# Patient Record
Sex: MALE | Race: BLACK OR AFRICAN AMERICAN | Employment: UNEMPLOYED | URBAN - METROPOLITAN AREA
[De-identification: names, ages, dates, MRNs, and addresses within clinical notes are randomized per-mention and may not be internally consistent; named-entity substitution may affect disease eponyms.]

---

## 2022-04-16 ENCOUNTER — APPOINTMENT (OUTPATIENT)
Dept: GENERAL RADIOLOGY | Age: 57
End: 2022-04-16
Attending: STUDENT IN AN ORGANIZED HEALTH CARE EDUCATION/TRAINING PROGRAM
Payer: COMMERCIAL

## 2022-04-16 ENCOUNTER — HOSPITAL ENCOUNTER (EMERGENCY)
Age: 57
Discharge: HOME | End: 2022-04-16
Attending: STUDENT IN AN ORGANIZED HEALTH CARE EDUCATION/TRAINING PROGRAM
Payer: COMMERCIAL

## 2022-04-16 ENCOUNTER — APPOINTMENT (OUTPATIENT)
Dept: GENERAL RADIOLOGY | Age: 57
End: 2022-04-16
Attending: PHYSICIAN ASSISTANT
Payer: COMMERCIAL

## 2022-04-16 VITALS
SYSTOLIC BLOOD PRESSURE: 156 MMHG | DIASTOLIC BLOOD PRESSURE: 125 MMHG | HEART RATE: 98 BPM | RESPIRATION RATE: 15 BRPM | TEMPERATURE: 98.5 F | OXYGEN SATURATION: 98 %

## 2022-04-16 DIAGNOSIS — M24.412 CHRONIC DISLOCATION OF LEFT SHOULDER: Primary | ICD-10-CM

## 2022-04-16 PROCEDURE — 73020 X-RAY EXAM OF SHOULDER: CPT

## 2022-04-16 PROCEDURE — 75810000301 HC ER LEVEL 1 CLOSED TREATMNT FRACTURE/DISLOCATION

## 2022-04-16 PROCEDURE — 99284 EMERGENCY DEPT VISIT MOD MDM: CPT

## 2022-04-16 PROCEDURE — 74011250637 HC RX REV CODE- 250/637: Performed by: PHYSICIAN ASSISTANT

## 2022-04-16 PROCEDURE — 74011250636 HC RX REV CODE- 250/636: Performed by: PHYSICIAN ASSISTANT

## 2022-04-16 PROCEDURE — 73030 X-RAY EXAM OF SHOULDER: CPT

## 2022-04-16 PROCEDURE — 96372 THER/PROPH/DIAG INJ SC/IM: CPT

## 2022-04-16 RX ORDER — DEXAMETHASONE SODIUM PHOSPHATE 100 MG/10ML
10 INJECTION INTRAMUSCULAR; INTRAVENOUS
Status: COMPLETED | OUTPATIENT
Start: 2022-04-16 | End: 2022-04-16

## 2022-04-16 RX ORDER — DIAZEPAM 2 MG/1
2 TABLET ORAL
Status: COMPLETED | OUTPATIENT
Start: 2022-04-16 | End: 2022-04-16

## 2022-04-16 RX ORDER — KETOROLAC TROMETHAMINE 30 MG/ML
60 INJECTION, SOLUTION INTRAMUSCULAR; INTRAVENOUS
Status: COMPLETED | OUTPATIENT
Start: 2022-04-16 | End: 2022-04-16

## 2022-04-16 RX ORDER — OXYCODONE AND ACETAMINOPHEN 7.5; 325 MG/1; MG/1
1 TABLET ORAL
Status: COMPLETED | OUTPATIENT
Start: 2022-04-16 | End: 2022-04-16

## 2022-04-16 RX ORDER — ONDANSETRON 2 MG/ML
4 INJECTION INTRAMUSCULAR; INTRAVENOUS
Status: DISCONTINUED | OUTPATIENT
Start: 2022-04-16 | End: 2022-04-17 | Stop reason: HOSPADM

## 2022-04-16 RX ORDER — MORPHINE SULFATE 4 MG/ML
4 INJECTION INTRAVENOUS
Status: DISCONTINUED | OUTPATIENT
Start: 2022-04-16 | End: 2022-04-17 | Stop reason: HOSPADM

## 2022-04-16 RX ORDER — DOCUSATE SODIUM 100 MG/1
CAPSULE, LIQUID FILLED ORAL
COMMUNITY

## 2022-04-16 RX ORDER — OXYCODONE AND ACETAMINOPHEN 5; 325 MG/1; MG/1
1 TABLET ORAL
Qty: 12 TABLET | Refills: 0 | Status: SHIPPED | OUTPATIENT
Start: 2022-04-16 | End: 2022-04-19

## 2022-04-16 RX ADMIN — DEXAMETHASONE SODIUM PHOSPHATE 10 MG: 10 INJECTION, SOLUTION INTRAMUSCULAR; INTRAVENOUS at 22:13

## 2022-04-16 RX ADMIN — OXYCODONE AND ACETAMINOPHEN 1 TABLET: 7.5; 325 TABLET ORAL at 22:13

## 2022-04-16 RX ADMIN — KETOROLAC TROMETHAMINE 60 MG: 30 INJECTION, SOLUTION INTRAMUSCULAR at 22:13

## 2022-04-16 RX ADMIN — DIAZEPAM 2 MG: 2 TABLET ORAL at 22:13

## 2022-04-16 NOTE — ED TRIAGE NOTES
Pt to er c/o left arm/shoulder pain for over a month, has been seen by his doctor for this and sts also been seen by an orthopedic doctor for his arm pain, sts it continues to dislocate.   Pulses present

## 2022-04-16 NOTE — ED NOTES
Pt sts he fell off the toilet march 24th, did not hit head, sts his arm was hurting prior to this fall but did hurt worse after fall

## 2022-04-17 NOTE — ED PROVIDER NOTES
Patient had a stroke 1-1/2 years ago and has had residual left-sided paralysis. He has had chronic left shoulder dislocation. He fell off the toilet as he slipped on some water about a month ago and thinks he may have exacerbated it. He has not been seen since then. There is no tingling to the arm or decreased sensation. He is right-handed. A friend drove him in tonight. Apparently he is staying in a hotel and has transportation problems. He had seen an orthopedic in the past regarding this but was unable to get to the appointments. No chest pain, shortness of breath, abdominal pain, dizziness or weakness, trouble with urination or bowel movements, swelling to the arm or other new symptoms. His friend was concerned because he is having trouble sleeping due to the pain in the shoulder so she brought him in. The history is provided by the patient. Shoulder Pain  Pertinent negatives include no chest pain, no abdominal pain, no headaches and no shortness of breath. Arm Pain  This is a chronic problem. The current episode started more than 1 week ago. The problem occurs constantly. The problem has been gradually worsening. Pertinent negatives include no chest pain, no abdominal pain, no headaches and no shortness of breath. Nothing aggravates the symptoms. Nothing relieves the symptoms. He has tried nothing for the symptoms. Past Medical History:   Diagnosis Date    Stroke [facility])        History reviewed. No pertinent surgical history. History reviewed. No pertinent family history.     Social History     Socioeconomic History    Marital status: SINGLE     Spouse name: Not on file    Number of children: Not on file    Years of education: Not on file    Highest education level: Not on file   Occupational History    Not on file   Tobacco Use    Smoking status: Never Smoker    Smokeless tobacco: Never Used   Substance and Sexual Activity    Alcohol use: Not Currently    Drug use: Never    Sexual activity: Not on file   Other Topics Concern    Not on file   Social History Narrative    Not on file     Social Determinants of Health     Financial Resource Strain:     Difficulty of Paying Living Expenses: Not on file   Food Insecurity:     Worried About Running Out of Food in the Last Year: Not on file    [place] of Food in the Last Year: Not on file   Transportation Needs:     Lack of Transportation (Medical): Not on file    Lack of Transportation (Non-Medical): Not on file   Physical Activity:     Days of Exercise per Week: Not on file    Minutes of Exercise per Session: Not on file   Stress:     Feeling of Stress : Not on file   Social Connections:     Frequency of Communication with Friends and Family: Not on file    Frequency of Social Gatherings with Friends and Family: Not on file    Attends [employer] Services: Not on file    Active Member of [address] [place] [address] Social Pulse or Organizations: Not on file    Attends Club or Organization Meetings: Not on file    Marital Status: Not on file   Intimate Partner Violence:     Fear of Current or Ex-Partner: Not on file    Emotionally Abused: Not on file    Physically Abused: Not on file    Sexually Abused: Not on file   Housing Stability:     Unable to Pay for Housing in the Last Year: Not on file    Number of [place] in the Last Year: Not on file    Unstable Housing in the Last Year: Not on file         ALLERGIES: Patient has no known allergies. Review of Systems   Constitutional: Negative. HENT: Negative. Eyes: Negative. Respiratory: Negative. Negative for shortness of breath. Cardiovascular: Negative. Negative for chest pain. Gastrointestinal: Negative. Negative for abdominal pain. Genitourinary: Negative. Musculoskeletal: Negative. Chronic left shoulder pain   Skin: Negative. Neurological: Negative. Negative for headaches. Psychiatric/Behavioral: Negative.     All other systems reviewed and are negative. Vitals:    04/16/22 1949   BP: (!) 163/106   Pulse: 98   Resp: 15   Temp: 98.5 °F (36.9 °C)   SpO2: 98%            Physical Exam  Vitals and nursing note reviewed. Exam conducted with a chaperone present (Dr. John Duque). Constitutional:       Appearance: Normal appearance. He is well-developed. HENT:      Head: Normocephalic and atraumatic. Right Ear: External ear normal.      Left Ear: External ear normal.      Nose: Nose normal.   Eyes:      Conjunctiva/sclera: Conjunctivae normal.      Pupils: Pupils are equal, round, and reactive to light. Cardiovascular:      Rate and Rhythm: Normal rate and regular rhythm. Heart sounds: Normal heart sounds. Pulmonary:      Effort: Pulmonary effort is normal.      Breath sounds: Normal breath sounds. Abdominal:      General: Bowel sounds are normal.      Palpations: Abdomen is soft. Musculoskeletal:         General: Tenderness and signs of injury present. No swelling or deformity. Normal range of motion. Left shoulder: Tenderness present. Arms:       Cervical back: Normal range of motion and neck supple. Right lower leg: No edema. Left lower leg: No edema. Skin:     General: Skin is warm and dry. Capillary Refill: Capillary refill takes less than 2 seconds. Neurological:      General: No focal deficit present. Mental Status: He is alert and oriented to person, place, and time. Deep Tendon Reflexes: Reflexes are normal and symmetric. Psychiatric:         Mood and Affect: Mood normal.         Behavior: Behavior normal.         Thought Content:  Thought content normal.         Judgment: Judgment normal.          MDM  Number of Diagnoses or Management Options     Amount and/or Complexity of Data Reviewed  Discuss the patient with other providers: yes (Dr. John Duque)    Risk of Complications, Morbidity, and/or Mortality  Presenting problems: moderate  Diagnostic procedures: moderate  Management options: moderate    Patient Progress  Patient progress: improved    ED Course as of 04/16/22 2306   Sat Apr 16, 2022   2202 Evaluated patient at bedside, history of recurrent shoulder dislocation, patient reports dislocation for 3 months, suffered a fall 1 month ago, ongoing pain since this fall. No fracture on x-ray. Evaluating a PA will reach out to orthopedic specialist for discussion, plans for pain control in this department. [BR]      ED Course User Index  [BR] Bowen Rossi DO       Reduction of Joint    Date/Time: 4/17/2022 2:12 AM  Performed by: Bowen Rossi DO  Authorized by: Bowen Rossi DO     Consent:     Consent obtained:  Verbal    Consent given by:  Patient    Risks discussed:  Nerve damage and pain    Alternatives discussed:  No treatment  Injury:     Injury location:  Shoulder    Shoulder injury location:  L shoulder    Hill-Sachs deformity: no    Pre-procedure assessment:     Neurological function: normal      Distal perfusion: normal      Range of motion: reduced    Sedation:     Sedation type: Oral meds for pain, Percocet. Anesthesia (see MAR for exact dosages): Anesthesia method:  None  Procedure details:     Skeletal traction used: yes      Reduction successful: yes      X-ray confirmed reduction: yes      Immobilization:  Sling (Sling and swath)  Post-procedure details:     Neurological function: normal      Distal perfusion: normal      Range of motion: unchanged      Patient tolerance of procedure: Tolerated well, no immediate complications  Comments:      57-year-old male patient with history of stroke affecting his left side presenting with chronically dislocated left shoulder. I attempted a manual reduction of the joint at bedside after patient was treated with oral medications for pain including Percocet and Valium. He was awake for the entire procedure and tolerated well.   No obvious reduction was palpated on exam, repeat imaging shows successful reduction of inferior subluxation of the joint. Patient placed in sling and swath              9:45 PM Dr. Daphnie Zapata here to see patient. They were discussed with him as well. 11:00 PM Dr. Daphnie Zapata here attempting to reduce shoulder. He would like more pain medication as it has been out so long, the muscles are very tight surrounding the area. Morphine 4 mg IV ordered. /106 now. The patient was observed in the ED. Results Reviewed:  XR SHOULDER LT 1 V   Final Result   Reduction of the anterior, inferior dislocation. No evidence of an acute   fracture. XR SHOULDER LT AP/LAT MIN 2 V   Final Result   Inferior subluxation of the humerus. Patient was referred to orthopedics, I will send him with pain medicine. Shoulder immobilizer was applied. He should rest, ice, elevate and avoid painful activities. Follow-up with Ortho. Case management referral was made so they can coordinate his care with the orthopedic and possibly help with rides to and from the orthopedic for further evaluation. He is stable for discharge and his friend is driving him home. I will send him with some Percocet at home for pain. I discussed the results of all labs, procedures, radiographs, and treatments with the patient and available family. Treatment plan is agreed upon and the patient is ready for discharge. All voiced understanding of the discharge plan and medication instructions or changes as appropriate. Questions about treatment in the ED were answered. All were encouraged to return should symptoms worsen or new problems develop.

## 2022-05-27 ENCOUNTER — OFFICE VISIT (OUTPATIENT)
Dept: ORTHOPEDIC SURGERY | Age: 57
End: 2022-05-27
Payer: COMMERCIAL

## 2022-05-27 DIAGNOSIS — S43.005D DISLOCATION, SHOULDER CLOSED, LEFT, SUBSEQUENT ENCOUNTER: ICD-10-CM

## 2022-05-27 DIAGNOSIS — M19.019 AC JOINT ARTHROPATHY: Primary | ICD-10-CM

## 2022-05-27 PROBLEM — I63.511 ACUTE RIGHT MCA STROKE (HCC): Status: ACTIVE | Noted: 2021-02-01

## 2022-05-27 PROBLEM — E78.00 HYPERCHOLESTEROLEMIA: Status: ACTIVE | Noted: 2017-03-22

## 2022-05-27 PROBLEM — E11.9 DIABETES (HCC): Status: ACTIVE | Noted: 2021-01-31

## 2022-05-27 PROBLEM — R31.0 GROSS HEMATURIA: Status: ACTIVE | Noted: 2021-07-07

## 2022-05-27 PROBLEM — I10 BENIGN ESSENTIAL HYPERTENSION: Status: ACTIVE | Noted: 2017-03-22

## 2022-05-27 PROBLEM — I48.91 ATRIAL FIBRILLATION (HCC): Status: ACTIVE | Noted: 2021-01-31

## 2022-05-27 PROBLEM — E87.6 HYPOKALEMIA: Status: ACTIVE | Noted: 2021-02-01

## 2022-05-27 PROBLEM — R06.09 DOE (DYSPNEA ON EXERTION): Status: ACTIVE | Noted: 2017-03-22

## 2022-05-27 PROBLEM — I69.30 HISTORY OF STROKE WITH CURRENT RESIDUAL EFFECTS: Status: ACTIVE | Noted: 2021-03-31

## 2022-05-27 PROBLEM — F43.23 ADJUSTMENT DISORDER WITH MIXED ANXIETY AND DEPRESSED MOOD: Status: ACTIVE | Noted: 2021-03-31

## 2022-05-27 PROBLEM — R94.31 ABNORMAL ELECTROCARDIOGRAM (ECG) (EKG): Status: ACTIVE | Noted: 2017-03-22

## 2022-05-27 PROBLEM — I69.354 HEMIPARESIS OF LEFT NONDOMINANT SIDE AS LATE EFFECT OF CEREBRAL INFARCTION (HCC): Status: ACTIVE | Noted: 2021-02-19

## 2022-05-27 PROBLEM — B20 HIV (HUMAN IMMUNODEFICIENCY VIRUS INFECTION) (HCC): Status: ACTIVE | Noted: 2021-01-31

## 2022-05-27 PROCEDURE — 99214 OFFICE O/P EST MOD 30 MIN: CPT | Performed by: PHYSICIAN ASSISTANT

## 2022-05-27 RX ORDER — POLYETHYLENE GLYCOL 3350 17 G/17G
POWDER, FOR SOLUTION ORAL
COMMUNITY

## 2022-05-27 RX ORDER — TRAMADOL HYDROCHLORIDE 50 MG/1
50 TABLET ORAL EVERY 8 HOURS PRN
Qty: 30 TABLET | Refills: 0 | Status: SHIPPED | OUTPATIENT
Start: 2022-05-27 | End: 2022-06-06

## 2022-05-27 RX ORDER — TERBINAFINE HYDROCHLORIDE 250 MG/1
TABLET ORAL
COMMUNITY

## 2022-05-27 RX ORDER — BACLOFEN 5 MG/1
TABLET ORAL
COMMUNITY

## 2022-05-27 RX ORDER — HYDROCODONE BITARTRATE AND ACETAMINOPHEN 7.5; 325 MG/1; MG/1
TABLET ORAL
COMMUNITY

## 2022-05-27 RX ORDER — ACETAMINOPHEN 325 MG/1
650 TABLET ORAL EVERY 4 HOURS PRN
COMMUNITY
Start: 2021-02-22

## 2022-05-27 RX ORDER — SENNA PLUS 8.6 MG/1
TABLET ORAL
COMMUNITY

## 2022-05-27 RX ORDER — TERAZOSIN 5 MG/1
CAPSULE ORAL
COMMUNITY

## 2022-05-27 RX ORDER — METOPROLOL TARTRATE 100 MG/1
TABLET ORAL
COMMUNITY

## 2022-05-27 RX ORDER — TRAMADOL HYDROCHLORIDE 50 MG/1
TABLET ORAL
COMMUNITY

## 2022-05-27 RX ORDER — METFORMIN HYDROCHLORIDE 500 MG/1
TABLET, EXTENDED RELEASE ORAL
COMMUNITY
Start: 2021-02-23

## 2022-05-27 RX ORDER — VENLAFAXINE HYDROCHLORIDE 150 MG/1
CAPSULE, EXTENDED RELEASE ORAL
COMMUNITY

## 2022-05-27 RX ORDER — LIDOCAINE 4 G/G
PATCH TOPICAL
COMMUNITY
Start: 2021-02-23

## 2022-05-27 RX ORDER — METOPROLOL TARTRATE 50 MG/1
50 TABLET, FILM COATED ORAL 2 TIMES DAILY
COMMUNITY
Start: 2021-02-22

## 2022-05-27 RX ORDER — HYDROCHLOROTHIAZIDE 12.5 MG/1
TABLET ORAL
COMMUNITY

## 2022-05-27 RX ORDER — TRIAMCINOLONE ACETONIDE 1 MG/G
CREAM TOPICAL
COMMUNITY

## 2022-05-27 RX ORDER — QUETIAPINE FUMARATE 25 MG/1
25 TABLET, FILM COATED ORAL
COMMUNITY
Start: 2021-03-31

## 2022-05-27 RX ORDER — OXYCODONE HYDROCHLORIDE AND ACETAMINOPHEN 5; 325 MG/1; MG/1
TABLET ORAL
COMMUNITY
Start: 2022-04-18

## 2022-05-27 RX ORDER — PSEUDOEPHEDRINE HCL 30 MG
TABLET ORAL
COMMUNITY

## 2022-05-27 RX ORDER — HYDROXYZINE HYDROCHLORIDE 25 MG/1
TABLET, FILM COATED ORAL
COMMUNITY

## 2022-05-27 RX ORDER — ATORVASTATIN CALCIUM 40 MG/1
TABLET, FILM COATED ORAL
COMMUNITY
Start: 2021-02-22

## 2022-05-27 RX ORDER — BICTEGRAVIR SODIUM, EMTRICITABINE, AND TENOFOVIR ALAFENAMIDE FUMARATE 50; 200; 25 MG/1; MG/1; MG/1
TABLET ORAL
COMMUNITY
Start: 2022-04-14

## 2022-05-27 RX ORDER — LISINOPRIL AND HYDROCHLOROTHIAZIDE 20; 12.5 MG/1; MG/1
TABLET ORAL
COMMUNITY

## 2022-05-27 RX ORDER — LOSARTAN POTASSIUM AND HYDROCHLOROTHIAZIDE 12.5; 5 MG/1; MG/1
TABLET ORAL
COMMUNITY

## 2022-05-27 RX ORDER — POTASSIUM CHLORIDE 20 MEQ/1
TABLET, EXTENDED RELEASE ORAL
COMMUNITY

## 2022-05-27 RX ORDER — FLUOXETINE HYDROCHLORIDE 20 MG/1
CAPSULE ORAL
COMMUNITY
Start: 2021-03-30

## 2022-05-30 NOTE — PROGRESS NOTES
Name: Cathy Zhao  YOB: 1965  Gender: male  MRN: [id number]    CC:   Chief Complaint   Patient presents with    Shoulder Pain     left shoulder recheck        HPI: Patient presents for FU of left shoulder pain for chronic inferior subluxation of the shoulder. Patient notes continued superior shoulder pain despite AC CSI on 5/5/22. Patient notes he does experience pain down the arm laterally even though it is relatively paralyzed. Notes interference with sleep. Patient notes he takes 15 tylenol per day. I immediately told the patient that is an overdose amount of Tylenol and educated him on the risk to his liver and OD. He expressed understanding. Allergies   Allergen Reactions    Amlodipine Shortness Of Breath     Past Medical History:   Diagnosis Date    Stroke [facility])      History reviewed. No pertinent surgical history. History reviewed. No pertinent family history. Social History     Socioeconomic History    Marital status: Single     Spouse name: Not on file    Number of children: Not on file    Years of education: Not on file    Highest education level: Not on file   Occupational History    Not on file   Tobacco Use    Smoking status: Never Smoker    Smokeless tobacco: Never Used   Substance and Sexual Activity    Alcohol use: Not Currently    Drug use: Never    Sexual activity: Not on file   Other Topics Concern    Not on file   Social History Narrative    Not on file     Social Determinants of Health     Financial Resource Strain:     Difficulty of Paying Living Expenses: Not on file   Food Insecurity:     Worried About Running Out of Food in the Last Year: Not on file    [place] of Food in the Last Year: Not on file   Transportation Needs:     Lack of Transportation (Medical): Not on file    Lack of Transportation (Non-Medical):  Not on file   Physical Activity:     Days of Exercise per Week: Not on file    Minutes of Exercise per Session: Not on file   Stress:     Feeling of Stress : Not on file   Social Connections:     Frequency of Communication with Friends and Family: Not on file    Frequency of Social Gatherings with Friends and Family: Not on file    Attends [employer] Services: Not on file    Active Member of Clubs or Organizations: Not on file    Attends Club or Organization Meetings: Not on file    Marital Status: Not on file   Intimate Partner Violence:     Fear of Current or Ex-Partner: Not on file    Emotionally Abused: Not on file    Physically Abused: Not on file    Sexually Abused: Not on file   Housing Stability:     Unable to Pay for Housing in the Last Year: Not on file    Number of [place] in the Last Year: Not on file    Unstable Housing in the Last Year: Not on file        No flowsheet data found. Review of Systems  Non-contributory    PE left shoulder:           SHOULDER    Left (involved)   Right     Skin  Intact  Intact     Radial Pulses  2+ symmetrical   2+ symmetrical     Myotomes  Diffusely decreased  Normal     Dermatomes   Diffusely decreased  Normal     ROM  Full passive motion  Full     Strength  1/5 throughout secondary to stroke  No weakness     Atrophy  Positive diffusely  None noted     Effusion/Swelling   None  None         Palpation  TTP AC joint, negative TTP biceps  No Tenderness         Bicep Tendon Rupture   Likely  Negative     Bear Hug, Belly Press  Negative  Not tested     Crossed Arm Adduction Test  Not tested  Not tested     Instability/Ant. Apprehension Test  None   None     Impingement  Negative  Negative                      XRAY: AP and grashey radiographs of the left shoulder were obtained and reviewed today. There is evidence of inferior subluxation of the [address] joint. No changes in comparison to previous visit. There also appears to be decreased bone density in abnormal pattern of the humerus - possibly secondary to non use.      A/Plan:     ICD-10-CM    1. AC joint arthropathy  M19.019 Amb External Referral To Pain Medicine     MRI HUMERUS LEFT W WO CONTRAST     traMADol (ULTRAM) 50 MG tablet   2. Dislocation, shoulder closed, left, subsequent encounter  S43.005D XR SHOULDER LEFT (MIN 2 VIEWS)     Amb External Referral To Pain Medicine     MRI HUMERUS LEFT W WO CONTRAST     traMADol (ULTRAM) 50 MG tablet        I discussed with the patient the difficulty of his shoulder with his deltoid and RTC being relatively nonfunctional.  We discussed in detail he is taking a toxic amount of Tylenol and counseled him on this. He will D/C the Tylenol and instead I will refer him to pain management. I will give him some tramadol to have prior to this appointment. We also discussed the possibility of radiofrequency ablation and evaluation if that would help by pain management. Furthermore, I would like to obtain an MRI with and without contrast to evaluate the abnormal bone appearance of the humerus. We discussed this may be due to non-use/paralysis of the arm, but I would like to rule out any pathology given his history of HIV, stroke, diabetes, Afib making this of increased complexity as well. Given the chronicity and severity of the patient's symptoms, we will obtain an MRI. We will see them back after the scan and make a determination regarding further treatment. If there is a tear or other problem, they may require surgery. If negative, would recommend NSAID's, PT, or injection. They are amenable to this treatment plan. Return for MRI RESULTS with Dr. Zafar Wang.       Debbie Brooks [place]  05/30/22

## 2022-06-01 ENCOUNTER — CLINICAL DOCUMENTATION (OUTPATIENT)
Dept: ORTHOPEDIC SURGERY | Age: 57
End: 2022-06-01

## 2022-09-06 ENCOUNTER — HOSPITAL ENCOUNTER (EMERGENCY)
Age: 57
Discharge: HOME | End: 2022-09-06
Attending: EMERGENCY MEDICINE
Payer: COMMERCIAL

## 2022-09-06 VITALS
HEIGHT: 69 IN | OXYGEN SATURATION: 100 % | TEMPERATURE: 97.7 F | RESPIRATION RATE: 18 BRPM | WEIGHT: 255 LBS | SYSTOLIC BLOOD PRESSURE: 115 MMHG | HEART RATE: 82 BPM | DIASTOLIC BLOOD PRESSURE: 84 MMHG | BODY MASS INDEX: 37.77 KG/M2

## 2022-09-06 DIAGNOSIS — S39.012A BACK STRAIN, INITIAL ENCOUNTER: Primary | ICD-10-CM

## 2022-09-06 LAB
ALBUMIN SERPL-MCNC: 4.1 G/DL (ref 3.5–5)
ALBUMIN/GLOB SERPL: 1 {RATIO} (ref 1.2–3.5)
ALP SERPL-CCNC: 75 U/L (ref 50–136)
ALT SERPL-CCNC: 15 U/L (ref 12–65)
ANION GAP SERPL CALC-SCNC: 4 MMOL/L (ref 4–13)
AST SERPL-CCNC: 24 U/L (ref 15–37)
BASOPHILS # BLD: 0 K/UL (ref 0–0.2)
BASOPHILS NFR BLD: 1 % (ref 0–2)
BILIRUB SERPL-MCNC: 1.2 MG/DL (ref 0.2–1.1)
BILIRUB UR QL: NEGATIVE
BUN SERPL-MCNC: 10 MG/DL (ref 6–23)
CALCIUM SERPL-MCNC: 9.3 MG/DL (ref 8.3–10.4)
CHLORIDE SERPL-SCNC: 106 MMOL/L (ref 101–110)
CO2 SERPL-SCNC: 29 MMOL/L (ref 21–32)
CREAT SERPL-MCNC: 1 MG/DL (ref 0.8–1.5)
DIFFERENTIAL METHOD BLD: ABNORMAL
EOSINOPHIL # BLD: 0.1 K/UL (ref 0–0.8)
EOSINOPHIL NFR BLD: 2 % (ref 0.5–7.8)
ERYTHROCYTE [DISTWIDTH] IN BLOOD BY AUTOMATED COUNT: 13.2 % (ref 11.9–14.6)
GLOBULIN SER CALC-MCNC: 4.3 G/DL (ref 2.3–3.5)
GLUCOSE SERPL-MCNC: 93 MG/DL (ref 65–100)
GLUCOSE UR QL STRIP.AUTO: NEGATIVE MG/DL
HCT VFR BLD AUTO: 47.6 % (ref 41.1–50.3)
HGB BLD-MCNC: 15.6 G/DL (ref 13.6–17.2)
IMM GRANULOCYTES # BLD AUTO: 0 K/UL (ref 0–0.5)
IMM GRANULOCYTES NFR BLD AUTO: 0 % (ref 0–5)
KETONES UR-MCNC: NEGATIVE MG/DL
LEUKOCYTE ESTERASE UR QL STRIP: NEGATIVE
LIPASE SERPL-CCNC: 94 U/L (ref 73–393)
LYMPHOCYTES # BLD: 1.5 K/UL (ref 0.5–4.6)
LYMPHOCYTES NFR BLD: 40 % (ref 13–44)
MCH RBC QN AUTO: 29.1 PG (ref 26.1–32.9)
MCHC RBC AUTO-ENTMCNC: 32.8 G/DL (ref 31.4–35)
MCV RBC AUTO: 88.6 FL (ref 79.6–97.8)
MONOCYTES # BLD: 0.3 K/UL (ref 0.1–1.3)
MONOCYTES NFR BLD: 8 % (ref 4–12)
NEUTS SEG # BLD: 1.9 K/UL (ref 1.7–8.2)
NEUTS SEG NFR BLD: 49 % (ref 43–78)
NITRITE UR QL: NEGATIVE
NRBC # BLD: 0 K/UL (ref 0–0.2)
PH UR: 6 [PH] (ref 5–9)
PLATELET # BLD AUTO: 186 K/UL (ref 150–450)
PMV BLD AUTO: 12 FL (ref 9.4–12.3)
POTASSIUM SERPL-SCNC: 3.9 MMOL/L (ref 3.5–5.1)
PROT SERPL-MCNC: 8.4 G/DL (ref 6.3–8.2)
PROT UR QL: NEGATIVE MG/DL
RBC # BLD AUTO: 5.37 M/UL (ref 4.23–5.6)
RBC # UR STRIP: ABNORMAL /UL
SERVICE CMNT-IMP: ABNORMAL
SODIUM SERPL-SCNC: 139 MMOL/L (ref 136–145)
SP GR UR: >1.03 (ref 1–1.02)
UROBILINOGEN UR QL: 0.2 EU/DL (ref 0.2–1)
WBC # BLD AUTO: 3.7 K/UL (ref 4.3–11.1)

## 2022-09-06 PROCEDURE — 85025 COMPLETE CBC W/AUTO DIFF WBC: CPT

## 2022-09-06 PROCEDURE — 80053 COMPREHEN METABOLIC PANEL: CPT

## 2022-09-06 PROCEDURE — 83690 ASSAY OF LIPASE: CPT

## 2022-09-06 PROCEDURE — 96374 THER/PROPH/DIAG INJ IV PUSH: CPT

## 2022-09-06 PROCEDURE — 81003 URINALYSIS AUTO W/O SCOPE: CPT

## 2022-09-06 PROCEDURE — 99284 EMERGENCY DEPT VISIT MOD MDM: CPT

## 2022-09-06 PROCEDURE — 6360000002 HC RX W HCPCS: Performed by: EMERGENCY MEDICINE

## 2022-09-06 RX ORDER — LIDOCAINE 50 MG/G
1 PATCH TOPICAL DAILY
Qty: 10 PATCH | Refills: 0 | Status: SHIPPED | OUTPATIENT
Start: 2022-09-06 | End: 2022-09-16

## 2022-09-06 RX ORDER — BACLOFEN 10 MG/1
10 TABLET ORAL 3 TIMES DAILY
Qty: 15 TABLET | Refills: 0 | Status: SHIPPED | OUTPATIENT
Start: 2022-09-06

## 2022-09-06 RX ORDER — KETOROLAC TROMETHAMINE 10 MG/1
10 TABLET, FILM COATED ORAL EVERY 6 HOURS PRN
Qty: 10 TABLET | Refills: 0 | Status: SHIPPED | OUTPATIENT
Start: 2022-09-06

## 2022-09-06 RX ORDER — KETOROLAC TROMETHAMINE 15 MG/ML
15 INJECTION, SOLUTION INTRAMUSCULAR; INTRAVENOUS ONCE
Status: COMPLETED | OUTPATIENT
Start: 2022-09-06 | End: 2022-09-06

## 2022-09-06 RX ADMIN — KETOROLAC TROMETHAMINE 15 MG: 15 INJECTION, SOLUTION INTRAMUSCULAR; INTRAVENOUS at 17:05

## 2022-09-06 ASSESSMENT — ENCOUNTER SYMPTOMS
ABDOMINAL SWELLING: 0
ABDOMINAL PAIN: 0
BACK PAIN: 1

## 2022-09-06 ASSESSMENT — PAIN - FUNCTIONAL ASSESSMENT: PAIN_FUNCTIONAL_ASSESSMENT: 0-10

## 2022-09-06 ASSESSMENT — PAIN SCALES - GENERAL: PAINLEVEL_OUTOF10: 9

## 2022-09-06 NOTE — ED PROVIDER NOTES
Emergency Department Provider Note                   PCP:                None Provider               Age: 60 y.o. Sex: male     No diagnosis found. DISPOSITION          MDM  Number of Diagnoses or Management Options  Diagnosis management comments: Patient has reproducible right lumbar muscle pain. Patient has no history of kidney stones but does have a trace blood. Patient states the pain is worse with movement leading to a more likely diagnosis of muscle spasm as opposed to kidney stone. We will DC home and outpatient follow-up with PCP. Amount and/or Complexity of Data Reviewed  Clinical lab tests: ordered and reviewed  Tests in the radiology section of CPT®: ordered and reviewed  Review and summarize past medical records: yes  Independent visualization of images, tracings, or specimens: yes    Risk of Complications, Morbidity, and/or Mortality  Presenting problems: moderate  Diagnostic procedures: moderate  Management options: moderate    Patient Progress  Patient progress: stable       Orders Placed This Encounter   Procedures    CBC with Auto Differential    CMP    Lipase    Diet NPO    POCT Urine Dipstick    POCT Urinalysis no Micro    Saline lock IV        Medications   ketorolac (TORADOL) injection 15 mg (has no administration in time range)       New Prescriptions    No medications on file        Sophie Young is a 60 y.o. male who presents to the Emergency Department with chief complaint of    Chief Complaint   Patient presents with    Back Pain    Urinary Pain      Patient is a 57-year-old male who presents with right lower back pain for the last 2 weeks worse with movement and touch. Patient denies any current abdominal pain. Patient denies any fevers or chills denies any cold cough congestion or shortness of breath. Patient has no history of kidney stones. Patient states the pain is worse with movement and denies any pain with urination or urinary frequency urgency.     The history is provided by the patient. Back Pain  Pain severity:  Mild  Onset quality:  Gradual  Duration:  2 weeks  Timing:  Intermittent  Progression:  Waxing and waning  Chronicity:  Recurrent  Context: not lifting heavy objects    Relieved by:  Being still  Worsened by: Movement  Ineffective treatments:  None tried  Associated symptoms: no abdominal pain, no abdominal swelling, no chest pain, no headaches, no leg pain, no numbness, no pelvic pain, no perianal numbness, no tingling and no weakness        Review of Systems   Cardiovascular:  Negative for chest pain. Gastrointestinal:  Negative for abdominal pain. Genitourinary:  Negative for pelvic pain. Musculoskeletal:  Positive for back pain. Neurological:  Negative for tingling, weakness, numbness and headaches. All other systems reviewed and are negative. Past Medical History:   Diagnosis Date    Stroke [facility])         History reviewed. No pertinent surgical history. History reviewed. No pertinent family history. Social History     Socioeconomic History    Marital status: Single     Spouse name: None    Number of children: None    Years of education: None    Highest education level: None   Tobacco Use    Smoking status: Never    Smokeless tobacco: Never   Substance and Sexual Activity    Alcohol use: Not Currently    Drug use: Never         Amlodipine     Previous Medications    ACETAMINOPHEN (TYLENOL) 325 MG TABLET    Take 650 mg by mouth every 4 hours as needed    APIXABAN (ELIQUIS) 5 MG TABS TABLET    2 times daily    ATORVASTATIN (LIPITOR) 40 MG TABLET    atorvastatin 40 mg tablet    BACLOFEN (LIORESAL) 5 MG TABLET    baclofen 5 mg tablet    BIKTARVY -25 MG TABS PER TABLET    TAKE 1 TABLET BY MOUTH ONCE DAILY    DOCUSATE (COLACE, DULCOLAX) 100 MG CAPS    Take by mouth    FLUOXETINE (PROZAC) 20 MG CAPSULE    fluoxetine 20 mg capsule    HANDICAP PLACARD [facility]    Supply prescription to [facility] with completed form RG-007A. HYDROCHLOROTHIAZIDE (HYDRODIURIL) 12.5 MG TABLET    hydrochlorothiazide 12.5 mg tablet   Take 1 tablet every day by oral route as directed for 90 days. HYDROCODONE-ACETAMINOPHEN (NORCO) 7.5-325 MG PER TABLET    hydrocodone 7.5 mg-acetaminophen 325 mg tablet   Take 1 tablet 3 times a day by oral route for 30 days. HYDROXYZINE (ATARAX) 25 MG TABLET    hydroxyzine HCl 25 mg tablet   TAKE 1 TABLET EVERY DAY AS NEEDED    LIDOCAINE 4 % EXTERNAL PATCH    Lidocaine Pain Relief 4 % topical patch    LISINOPRIL-HYDROCHLOROTHIAZIDE (PRINZIDE;ZESTORETIC) 20-12.5 MG PER TABLET    lisinopril 20 mg-hydrochlorothiazide 12.5 mg tablet    LOSARTAN-HYDROCHLOROTHIAZIDE (HYZAAR) 50-12.5 MG PER TABLET    losartan 50 mg-hydrochlorothiazide 12.5 mg tablet    METFORMIN (GLUCOPHAGE-XR) 500 MG EXTENDED RELEASE TABLET    metformin  mg tablet,extended release 24 hr    METOPROLOL (LOPRESSOR) 100 MG TABLET    metoprolol tartrate 100 mg tablet    METOPROLOL TARTRATE (LOPRESSOR) 50 MG TABLET    Take 50 mg by mouth 2 times daily    OXYCODONE-ACETAMINOPHEN (PERCOCET) 5-325 MG PER TABLET    TAKE 1 TABLET BY MOUTH EVERY 6 HOURS AS NEEDED FOR PAIN FOR UP TO 3 DAYS. MAX 4 TABS DAILY    POLYETHYLENE GLYCOL (GLYCOLAX) 17 GM/SCOOP POWDER    polyethylene glycol 3350 17 gram oral powder packet    POTASSIUM CHLORIDE (KLOR-CON M) 20 MEQ EXTENDED RELEASE TABLET    potassium chloride ER 20 mEq tablet,extended release    QUETIAPINE (SEROQUEL) 25 MG TABLET    Take 25 mg by mouth    SENNA (SENOKOT) 8.6 MG TABLET    Senna Laxative 8.6 mg tablet   2 po qd    TERAZOSIN (HYTRIN) 5 MG CAPSULE    terazosin 5 mg capsule   Take 1 capsule every day by oral route for 30 days. TERBINAFINE (LAMISIL) 1 % CREAM    terbinafine HCl 1 % topical cream    TERBINAFINE (LAMISIL) 250 MG TABLET    terbinafine HCl 250 mg tablet    TRAMADOL (ULTRAM) 50 MG TABLET    tramadol 50 mg tablet   Take 1 tablet every day by oral route for 30 days.     TRIAMCINOLONE (KENALOG) 0.1 % CREAM triamcinolone acetonide 0.1 % topical cream   APPLY A THIN LAYER TO THE AFFECTED AREA(S) BY TOPICAL ROUTE 2 TIMES PER DAY    VENLAFAXINE (EFFEXOR XR) 150 MG EXTENDED RELEASE CAPSULE    venlafaxine  mg capsule,extended release 24 hr   Take 1 capsule twice a day by oral route for 30 days. Vitals signs and nursing note reviewed. Patient Vitals for the past 4 hrs:   Temp Pulse Resp BP SpO2   09/06/22 1400 97.7 °F (36.5 °C) 82 18 113/69 99 %          Physical Exam  Vitals and nursing note reviewed. Constitutional:       Appearance: Normal appearance. HENT:      Head: Normocephalic and atraumatic. Nose: Nose normal.      Mouth/Throat:      Mouth: Mucous membranes are moist.   Eyes:      Extraocular Movements: Extraocular movements intact. Conjunctiva/sclera: Conjunctivae normal.      Pupils: Pupils are equal, round, and reactive to light. Cardiovascular:      Rate and Rhythm: Normal rate. Pulses: Normal pulses. Heart sounds: Normal heart sounds. Pulmonary:      Effort: Pulmonary effort is normal.      Breath sounds: Normal breath sounds. Abdominal:      General: Abdomen is flat. Musculoskeletal:         General: Normal range of motion. Cervical back: Normal range of motion and neck supple. Comments: Left paraspinal lumbar mild tenderness palpation. No midline tenderness. Skin:     General: Skin is warm. Capillary Refill: Capillary refill takes less than 2 seconds. Neurological:      Mental Status: He is alert. Mental status is at baseline. Psychiatric:         Mood and Affect: Mood normal.         Behavior: Behavior normal.         Thought Content:  Thought content normal.         Judgment: Judgment normal.        Procedures      Results Include:    Recent Results (from the past 24 hour(s))   CBC with Auto Differential    Collection Time: 09/06/22  2:06 PM   Result Value Ref Range    WBC 3.7 (L) 4.3 - 11.1 K/uL    RBC 5.37 4.23 - 5.6 M/uL    Hemoglobin 15.6 13.6 - 17.2 g/dL    Hematocrit 47.6 41.1 - 50.3 %    MCV 88.6 79.6 - 97.8 FL    MCH 29.1 26.1 - 32.9 PG    MCHC 32.8 31.4 - 35.0 g/dL    RDW 13.2 11.9 - 14.6 %    Platelets 694 498 - 863 K/uL    MPV 12.0 9.4 - 12.3 FL    nRBC 0.00 0.0 - 0.2 K/uL    Differential Type AUTOMATED      Seg Neutrophils 49 43 - 78 %    Lymphocytes 40 13 - 44 %    Monocytes 8 4.0 - 12.0 %    Eosinophils % 2 0.5 - 7.8 %    Basophils 1 0.0 - 2.0 %    Immature Granulocytes 0 0.0 - 5.0 %    Segs Absolute 1.9 1.7 - 8.2 K/UL    Absolute Lymph # 1.5 0.5 - 4.6 K/UL    Absolute Mono # 0.3 0.1 - 1.3 K/UL    Absolute Eos # 0.1 0.0 - 0.8 K/UL    Basophils Absolute 0.0 0.0 - 0.2 K/UL    Absolute Immature Granulocyte 0.0 0.0 - 0.5 K/UL   CMP    Collection Time: 09/06/22  2:06 PM   Result Value Ref Range    Sodium 139 136 - 145 mmol/L    Potassium 3.9 3.5 - 5.1 mmol/L    Chloride 106 101 - 110 mmol/L    CO2 29 21 - 32 mmol/L    Anion Gap 4 4 - 13 mmol/L    Glucose 93 65 - 100 mg/dL    BUN 10 6 - 23 MG/DL    Creatinine 1.00 0.8 - 1.5 MG/DL    GFR African American >60 >60 ml/min/1.73m2    GFR Non- >60 >60 ml/min/1.73m2    Calcium 9.3 8.3 - 10.4 MG/DL    Total Bilirubin 1.2 (H) 0.2 - 1.1 MG/DL    ALT 15 12 - 65 U/L    AST 24 15 - 37 U/L    Alk Phosphatase 75 50 - 136 U/L    Total Protein 8.4 (H) 6.3 - 8.2 g/dL    Albumin 4.1 3.5 - 5.0 g/dL    Globulin 4.3 (H) 2.3 - 3.5 g/dL    Albumin/Globulin Ratio 1.0 (L) 1.2 - 3.5     Lipase    Collection Time: 09/06/22  2:06 PM   Result Value Ref Range    Lipase 94 73 - 393 U/L   POCT Urinalysis no Micro    Collection Time: 09/06/22  4:26 PM   Result Value Ref Range    Specific Gravity, Urine, POC >1.030 (H) 1.001 - 1.023    pH, Urine, POC 6.0 5.0 - 9.0      Protein, Urine, POC Negative NEG mg/dL    Glucose, UA POC Negative NEG mg/dL    Ketones, Urine, POC Negative NEG mg/dL    Bilirubin, Urine, POC Negative NEG      Blood, UA POC Trace Intact (A) NEG      URINE UROBILINOGEN POC 0.2 0.2 - 1.0 EU/dL Nitrate, Urine, POC Negative NEG      Leukocyte Est, UA POC Negative NEG      Performed by: Martha Schwarz           No orders to display                          Voice dictation software was used during the making of this note. This software is not perfect and grammatical and other typographical errors may be present. This note has not been completely proofread for errors.       Marielos Breaux MD  09/06/22 1370

## 2022-09-06 NOTE — ED NOTES
I have reviewed discharge instructions with the patient. The patient verbalized understanding. Patient left ED via Discharge Method: STRETCHER to Home with transport    Opportunity for questions and clarification provided. Patient given 1 scripts. To continue your aftercare when you leave the hospital, you may receive an automated call from our care team to check in on how you are doing.  This is a free service and part of our promise to provide the best care and service to meet your aftercare needs. \" If you have questions, or wish to unsubscribe from this service please call [phone].  Thank you for Choosing our [facility] Emergency Department.         Mayda Dao RN  09/06/22 Payton Cooper RN  09/06/22 4921

## 2022-09-06 NOTE — ED TRIAGE NOTES
Patient  presents via [facility] coming from MD office. Patient was at dr. Blanca Raya today. Patient with complaints of lower back pain radiating to lower abdomen questions of UTI. Patient hypertensive enroute. Patient did not take his medications this morning.     VSS enroute with EMS

## 2023-01-27 DIAGNOSIS — S43.005D DISLOCATION, SHOULDER CLOSED, LEFT, SUBSEQUENT ENCOUNTER: ICD-10-CM

## 2023-01-27 DIAGNOSIS — M19.019 AC JOINT ARTHROPATHY: Primary | ICD-10-CM

## 2023-01-27 DIAGNOSIS — M25.512 LEFT SHOULDER PAIN, UNSPECIFIED CHRONICITY: ICD-10-CM

## 2023-02-16 ENCOUNTER — APPOINTMENT (OUTPATIENT)
Dept: MRI IMAGING | Age: 58
DRG: 045 | End: 2023-02-16
Payer: MEDICAID

## 2023-02-16 ENCOUNTER — APPOINTMENT (OUTPATIENT)
Dept: CT IMAGING | Age: 58
DRG: 045 | End: 2023-02-16
Payer: MEDICAID

## 2023-02-16 ENCOUNTER — HOSPITAL ENCOUNTER (INPATIENT)
Age: 58
LOS: 4 days | Discharge: HOME | DRG: 045 | End: 2023-02-20
Attending: STUDENT IN AN ORGANIZED HEALTH CARE EDUCATION/TRAINING PROGRAM | Admitting: FAMILY MEDICINE
Payer: MEDICAID

## 2023-02-16 DIAGNOSIS — R47.1 DYSARTHRIA: Primary | ICD-10-CM

## 2023-02-16 DIAGNOSIS — R07.9 CHEST PAIN, UNSPECIFIED TYPE: ICD-10-CM

## 2023-02-16 DIAGNOSIS — I63.9 ACUTE CVA (CEREBROVASCULAR ACCIDENT) (HCC): ICD-10-CM

## 2023-02-16 PROBLEM — R47.81 SLURRED SPEECH: Status: ACTIVE | Noted: 2023-02-16

## 2023-02-16 LAB
ALBUMIN SERPL-MCNC: 3.2 G/DL (ref 3.5–5)
ALBUMIN/GLOB SERPL: 0.6 (ref 0.4–1.6)
ALP SERPL-CCNC: 60 U/L (ref 50–136)
ALT SERPL-CCNC: 20 U/L (ref 12–65)
ANION GAP SERPL CALC-SCNC: 5 MMOL/L (ref 2–11)
AST SERPL-CCNC: 26 U/L (ref 15–37)
BASOPHILS # BLD: 0 K/UL (ref 0–0.2)
BASOPHILS NFR BLD: 1 % (ref 0–2)
BILIRUB SERPL-MCNC: 0.8 MG/DL (ref 0.2–1.1)
BUN SERPL-MCNC: 12 MG/DL (ref 6–23)
CALCIUM SERPL-MCNC: 8.9 MG/DL (ref 8.3–10.4)
CHLORIDE SERPL-SCNC: 107 MMOL/L (ref 101–110)
CO2 SERPL-SCNC: 26 MMOL/L (ref 21–32)
CREAT SERPL-MCNC: 1.2 MG/DL (ref 0.8–1.5)
DIFFERENTIAL METHOD BLD: NORMAL
EKG ATRIAL RATE: 181 BPM
EKG DIAGNOSIS: NORMAL
EKG P AXIS: 0 DEGREES
EKG P-R INTERVAL: 84 MS
EKG Q-T INTERVAL: 410 MS
EKG QRS DURATION: 99 MS
EKG QTC CALCULATION (BAZETT): 476 MS
EKG R AXIS: 245 DEGREES
EKG T AXIS: -40 DEGREES
EKG VENTRICULAR RATE: 85 BPM
EOSINOPHIL # BLD: 0.1 K/UL (ref 0–0.8)
EOSINOPHIL NFR BLD: 2 % (ref 0.5–7.8)
ERYTHROCYTE [DISTWIDTH] IN BLOOD BY AUTOMATED COUNT: 12.7 % (ref 11.9–14.6)
GLOBULIN SER CALC-MCNC: 5.1 G/DL (ref 2.8–4.5)
GLUCOSE BLD STRIP.AUTO-MCNC: 159 MG/DL (ref 65–100)
GLUCOSE BLD STRIP.AUTO-MCNC: 89 MG/DL (ref 65–100)
GLUCOSE SERPL-MCNC: 97 MG/DL (ref 65–100)
HCT VFR BLD AUTO: 41.6 % (ref 41.1–50.3)
HGB BLD-MCNC: 13.9 G/DL (ref 13.6–17.2)
IMM GRANULOCYTES # BLD AUTO: 0 K/UL (ref 0–0.5)
IMM GRANULOCYTES NFR BLD AUTO: 1 % (ref 0–5)
INR BLD: 1.2 (ref 0.9–1.2)
INR PPP: 1.2
LYMPHOCYTES # BLD: 1.5 K/UL (ref 0.5–4.6)
LYMPHOCYTES NFR BLD: 34 % (ref 13–44)
MCH RBC QN AUTO: 28.2 PG (ref 26.1–32.9)
MCHC RBC AUTO-ENTMCNC: 33.4 G/DL (ref 31.4–35)
MCV RBC AUTO: 84.4 FL (ref 82–102)
MONOCYTES # BLD: 0.5 K/UL (ref 0.1–1.3)
MONOCYTES NFR BLD: 10 % (ref 4–12)
NEUTS SEG # BLD: 2.3 K/UL (ref 1.7–8.2)
NEUTS SEG NFR BLD: 52 % (ref 43–78)
NRBC # BLD: 0 K/UL (ref 0–0.2)
PLATELET # BLD AUTO: 301 K/UL (ref 150–450)
PMV BLD AUTO: 10.7 FL (ref 9.4–12.3)
POTASSIUM SERPL-SCNC: 3.4 MMOL/L (ref 3.5–5.1)
PROT SERPL-MCNC: 8.3 G/DL (ref 6.3–8.2)
PROTHROMBIN TIME: 16.1 SEC (ref 12.6–14.3)
PT BLD: 13.9 SECS (ref 9.6–11.6)
RBC # BLD AUTO: 4.93 M/UL (ref 4.23–5.6)
SERVICE CMNT-IMP: ABNORMAL
SERVICE CMNT-IMP: NORMAL
SODIUM SERPL-SCNC: 138 MMOL/L (ref 133–143)
TROPONIN I SERPL HS-MCNC: 87.3 PG/ML (ref 0–14)
TROPONIN I SERPL HS-MCNC: 88.2 PG/ML (ref 0–14)
WBC # BLD AUTO: 4.3 K/UL (ref 4.3–11.1)

## 2023-02-16 PROCEDURE — 70496 CT ANGIOGRAPHY HEAD: CPT

## 2023-02-16 PROCEDURE — 93005 ELECTROCARDIOGRAM TRACING: CPT | Performed by: STUDENT IN AN ORGANIZED HEALTH CARE EDUCATION/TRAINING PROGRAM

## 2023-02-16 PROCEDURE — APPNB60 APP NON BILLABLE TIME 46-60 MINS: Performed by: NURSE PRACTITIONER

## 2023-02-16 PROCEDURE — 1100000000 HC RM PRIVATE

## 2023-02-16 PROCEDURE — 6360000004 HC RX CONTRAST MEDICATION: Performed by: PSYCHIATRY & NEUROLOGY

## 2023-02-16 PROCEDURE — 0042T CT BRAIN PERFUSION: CPT

## 2023-02-16 PROCEDURE — 99291 CRITICAL CARE FIRST HOUR: CPT | Performed by: PSYCHIATRY & NEUROLOGY

## 2023-02-16 PROCEDURE — 6360000002 HC RX W HCPCS: Performed by: FAMILY MEDICINE

## 2023-02-16 PROCEDURE — 85610 PROTHROMBIN TIME: CPT

## 2023-02-16 PROCEDURE — 82962 GLUCOSE BLOOD TEST: CPT

## 2023-02-16 PROCEDURE — 99285 EMERGENCY DEPT VISIT HI MDM: CPT

## 2023-02-16 PROCEDURE — 70498 CT ANGIOGRAPHY NECK: CPT

## 2023-02-16 PROCEDURE — 4A03X5D MEASUREMENT OF ARTERIAL FLOW, INTRACRANIAL, EXTERNAL APPROACH: ICD-10-PCS | Performed by: HOSPITALIST

## 2023-02-16 PROCEDURE — 80053 COMPREHEN METABOLIC PANEL: CPT

## 2023-02-16 PROCEDURE — 85025 COMPLETE CBC W/AUTO DIFF WBC: CPT

## 2023-02-16 PROCEDURE — 70450 CT HEAD/BRAIN W/O DYE: CPT

## 2023-02-16 PROCEDURE — 70551 MRI BRAIN STEM W/O DYE: CPT

## 2023-02-16 PROCEDURE — 6370000000 HC RX 637 (ALT 250 FOR IP): Performed by: FAMILY MEDICINE

## 2023-02-16 PROCEDURE — 84484 ASSAY OF TROPONIN QUANT: CPT

## 2023-02-16 RX ORDER — POLYETHYLENE GLYCOL 3350 17 G/17G
17 POWDER, FOR SOLUTION ORAL DAILY PRN
Status: DISCONTINUED | OUTPATIENT
Start: 2023-02-16 | End: 2023-02-20 | Stop reason: HOSPADM

## 2023-02-16 RX ORDER — ACETAMINOPHEN 325 MG/1
650 TABLET ORAL EVERY 4 HOURS PRN
Status: DISCONTINUED | OUTPATIENT
Start: 2023-02-16 | End: 2023-02-20 | Stop reason: HOSPADM

## 2023-02-16 RX ORDER — ONDANSETRON 2 MG/ML
4 INJECTION INTRAMUSCULAR; INTRAVENOUS EVERY 6 HOURS PRN
Status: DISCONTINUED | OUTPATIENT
Start: 2023-02-16 | End: 2023-02-20 | Stop reason: HOSPADM

## 2023-02-16 RX ORDER — INSULIN LISPRO 100 [IU]/ML
0-4 INJECTION, SOLUTION INTRAVENOUS; SUBCUTANEOUS NIGHTLY
Status: DISCONTINUED | OUTPATIENT
Start: 2023-02-16 | End: 2023-02-20 | Stop reason: HOSPADM

## 2023-02-16 RX ORDER — ONDANSETRON 4 MG/1
4 TABLET, ORALLY DISINTEGRATING ORAL EVERY 8 HOURS PRN
Status: DISCONTINUED | OUTPATIENT
Start: 2023-02-16 | End: 2023-02-20 | Stop reason: HOSPADM

## 2023-02-16 RX ORDER — ATORVASTATIN CALCIUM 80 MG/1
80 TABLET, FILM COATED ORAL NIGHTLY
Status: DISCONTINUED | OUTPATIENT
Start: 2023-02-16 | End: 2023-02-20 | Stop reason: HOSPADM

## 2023-02-16 RX ORDER — INSULIN LISPRO 100 [IU]/ML
0-4 INJECTION, SOLUTION INTRAVENOUS; SUBCUTANEOUS
Status: DISCONTINUED | OUTPATIENT
Start: 2023-02-16 | End: 2023-02-20 | Stop reason: HOSPADM

## 2023-02-16 RX ORDER — ASPIRIN 81 MG/1
81 TABLET ORAL DAILY
Status: DISCONTINUED | OUTPATIENT
Start: 2023-02-16 | End: 2023-02-17

## 2023-02-16 RX ORDER — LABETALOL HYDROCHLORIDE 5 MG/ML
10 INJECTION, SOLUTION INTRAVENOUS EVERY 10 MIN PRN
Status: DISCONTINUED | OUTPATIENT
Start: 2023-02-16 | End: 2023-02-20 | Stop reason: HOSPADM

## 2023-02-16 RX ORDER — ATORVASTATIN CALCIUM 80 MG/1
80 TABLET, FILM COATED ORAL NIGHTLY
Status: DISCONTINUED | OUTPATIENT
Start: 2023-02-16 | End: 2023-02-16

## 2023-02-16 RX ORDER — HYDROCODONE BITARTRATE AND ACETAMINOPHEN 7.5; 325 MG/1; MG/1
1 TABLET ORAL EVERY 8 HOURS PRN
Status: DISCONTINUED | OUTPATIENT
Start: 2023-02-16 | End: 2023-02-20 | Stop reason: HOSPADM

## 2023-02-16 RX ORDER — ENOXAPARIN SODIUM 100 MG/ML
40 INJECTION SUBCUTANEOUS DAILY
Status: DISCONTINUED | OUTPATIENT
Start: 2023-02-16 | End: 2023-02-16

## 2023-02-16 RX ORDER — ASPIRIN 300 MG/1
300 SUPPOSITORY RECTAL DAILY
Status: DISCONTINUED | OUTPATIENT
Start: 2023-02-16 | End: 2023-02-17

## 2023-02-16 RX ORDER — VENLAFAXINE HYDROCHLORIDE 150 MG/1
150 CAPSULE, EXTENDED RELEASE ORAL 2 TIMES DAILY
Status: DISCONTINUED | OUTPATIENT
Start: 2023-02-16 | End: 2023-02-20 | Stop reason: HOSPADM

## 2023-02-16 RX ORDER — METOPROLOL TARTRATE 50 MG/1
50 TABLET, FILM COATED ORAL 2 TIMES DAILY
Status: DISCONTINUED | OUTPATIENT
Start: 2023-02-16 | End: 2023-02-20 | Stop reason: HOSPADM

## 2023-02-16 RX ORDER — FLUOXETINE 10 MG/1
20 CAPSULE ORAL DAILY
Status: DISCONTINUED | OUTPATIENT
Start: 2023-02-17 | End: 2023-02-20 | Stop reason: HOSPADM

## 2023-02-16 RX ORDER — ENOXAPARIN SODIUM 100 MG/ML
30 INJECTION SUBCUTANEOUS 2 TIMES DAILY
Status: DISCONTINUED | OUTPATIENT
Start: 2023-02-16 | End: 2023-02-17

## 2023-02-16 RX ORDER — QUETIAPINE FUMARATE 25 MG/1
25 TABLET, FILM COATED ORAL NIGHTLY
Status: DISCONTINUED | OUTPATIENT
Start: 2023-02-16 | End: 2023-02-20 | Stop reason: HOSPADM

## 2023-02-16 RX ORDER — DEXTROSE MONOHYDRATE 100 MG/ML
INJECTION, SOLUTION INTRAVENOUS CONTINUOUS PRN
Status: DISCONTINUED | OUTPATIENT
Start: 2023-02-16 | End: 2023-02-20 | Stop reason: HOSPADM

## 2023-02-16 RX ADMIN — METOPROLOL TARTRATE 50 MG: 50 TABLET, FILM COATED ORAL at 20:50

## 2023-02-16 RX ADMIN — ENOXAPARIN SODIUM 30 MG: 30 INJECTION SUBCUTANEOUS at 20:50

## 2023-02-16 RX ADMIN — IOPAMIDOL 100 ML: 755 INJECTION, SOLUTION INTRAVENOUS at 11:41

## 2023-02-16 RX ADMIN — VENLAFAXINE HYDROCHLORIDE 150 MG: 37.5 CAPSULE, EXTENDED RELEASE ORAL at 20:50

## 2023-02-16 RX ADMIN — ATORVASTATIN CALCIUM 80 MG: 80 TABLET, FILM COATED ORAL at 20:50

## 2023-02-16 RX ADMIN — QUETIAPINE FUMARATE 25 MG: 25 TABLET ORAL at 20:51

## 2023-02-16 ASSESSMENT — ENCOUNTER SYMPTOMS
WHEEZING: 0
VOMITING: 0
BACK PAIN: 0
CHEST TIGHTNESS: 0
CONSTIPATION: 0
COLOR CHANGE: 0
NAUSEA: 0
ABDOMINAL PAIN: 0
COUGH: 0
ABDOMINAL DISTENTION: 0
SHORTNESS OF BREATH: 0

## 2023-02-16 ASSESSMENT — PAIN SCALES - GENERAL
PAINLEVEL_OUTOF10: 0
PAINLEVEL_OUTOF10: 3

## 2023-02-16 ASSESSMENT — PAIN DESCRIPTION - LOCATION: LOCATION: HEAD

## 2023-02-16 ASSESSMENT — PAIN DESCRIPTION - DESCRIPTORS: DESCRIPTORS: ACHING

## 2023-02-16 NOTE — ED PROVIDER NOTES
Emergency Department Provider Note                   PCP:                On File Not (Inactive)               Age: 59 y.o. Sex: male     No diagnosis found. DISPOSITION          Medical Decision Making  52-year-old male patient presenting via EMS with reported sudden onset of dysarthria, facial droop that started approximately 1 hour prior to arrival.  Also endorsing rotational dizziness and headache. Patient is prescribed Eliquis, has not taken this medication approximately 1 week as he has run out and unable to refill it. History of previous stroke affecting the left side. Given these new symptoms, decision made to initiate Code S. Neurology recommending against tPA at this time. Recommending admission for completion of stroke work-up including MRI imaging which has been ordered. Spoke to hospitalist regarding the for admission, kindly agreed evaluate this patient for admission. Critical care time: 40 minutes of critical care time was performed in the emergency department. This was separate from any other procedures listed during the patients emergency department course. The failure to initiate these interventions on an urgent basis would likely have resulted in sudden, clinically significant or life-threatening deterioration in the patients condition. Amount and/or Complexity of Data Reviewed  Independent Historian: EMS  External Data Reviewed: labs, radiology, ECG and notes. Labs: ordered. Radiology: ordered. ECG/medicine tests: ordered. Discussion of management or test interpretation with external provider(s): Neurology provider at bedside  Hospitalist for admission    Risk  Decision regarding hospitalization. Diagnosis or treatment significantly limited by social determinants of health.     Critical Care  Total time providing critical care: 30-74 minutes                        ED Course as of 02/16/23 1215   Thu Feb 16, 2023   1142 EKG interpretation: Atrial fibrillation, rate of 85, rightward axis, no ischemia. [BR]   5109 Patient has been seen and evaluated neurology. Plans to obtain CT perfusion, CTA [BR]   1204 . [BR]      ED Course User Index  [BR] Tonia Goodman, DO        Orders Placed This Encounter   Procedures    CT HEAD WO CONTRAST    CBC with Auto Differential    Comprehensive Metabolic Panel    Protime-INR    Troponin    Diet NPO    NIHSS    Nursing swallow assessment    POCT Glucose    EKG 12 Lead        Medications - No data to display    New Prescriptions    No medications on file        Domingo Shore is a 59 y.o. male who presents to the Emergency Department with chief complaint of    Chief Complaint   Patient presents with    Chest Pain    Fatigue    Headache    Hypertension      53-year-old male patient presenting to this department via EMS with reports of sudden onset trouble speaking, rotational type dizziness that started proxy 1 hour prior to arrival.  He also endorsed some chest pain that is been intermittent nature for the past several weeks. He has a history of previous stroke causing near complete paralysis of left upper and lower extremities. His weakness is unchanged per report. He denies any sensation change in his right arm or right leg. He tells me that his previous stroke occurred several years ago. He has been prescribed Eliquis for atrial fibrillation and has been out of his medication for proxy 1 week secondary to inability to refill it. He was also recently admitted to [facility] and told he had a \"light heart attack\". He has had ongoing discomfort intermittently in his chest from time to time since his diagnosis. Patient has significant dysarthria which limits history and physical slightly. The history is provided by the patient. The history is limited by a language barrier. No  was used. Review of Systems   Constitutional:  Negative for chills and fatigue. HENT:  Negative for congestion. Eyes:  Negative for visual disturbance. Respiratory:  Negative for cough, chest tightness, shortness of breath and wheezing. Cardiovascular:  Positive for chest pain. Negative for leg swelling. Gastrointestinal:  Negative for abdominal distention, abdominal pain, constipation, nausea and vomiting. Genitourinary:  Negative for difficulty urinating, dysuria, flank pain and hematuria. Musculoskeletal:  Negative for back pain, neck pain and neck stiffness. Skin:  Negative for color change. Neurological:  Positive for dizziness and speech difficulty. Negative for light-headedness, numbness and headaches. All other systems reviewed and are negative. Past Medical History:   Diagnosis Date    Stroke [facility])         No past surgical history on file. No family history on file. Social History     Socioeconomic History    Marital status: Single   Tobacco Use    Smoking status: Never    Smokeless tobacco: Never   Substance and Sexual Activity    Alcohol use: Not Currently    Drug use: Never         Amlodipine     Previous Medications    ACETAMINOPHEN (TYLENOL) 325 MG TABLET    Take 650 mg by mouth every 4 hours as needed    APIXABAN (ELIQUIS) 5 MG TABS TABLET    2 times daily    ATORVASTATIN (LIPITOR) 40 MG TABLET    atorvastatin 40 mg tablet    BACLOFEN (LIORESAL) 10 MG TABLET    Take 1 tablet by mouth 3 times daily    BACLOFEN (LIORESAL) 5 MG TABLET    baclofen 5 mg tablet    BIKTARVY -25 MG TABS PER TABLET    TAKE 1 TABLET BY MOUTH ONCE DAILY    DOCUSATE (COLACE, DULCOLAX) 100 MG CAPS    Take by mouth    FLUOXETINE (PROZAC) 20 MG CAPSULE    fluoxetine 20 mg capsule    HANDICAP PLACARD [facility]    Supply prescription to [facility] with completed form RG-007A. HYDROCHLOROTHIAZIDE (HYDRODIURIL) 12.5 MG TABLET    hydrochlorothiazide 12.5 mg tablet   Take 1 tablet every day by oral route as directed for 90 days.     HYDROCODONE-ACETAMINOPHEN (NORCO) 7.5-325 MG PER TABLET    hydrocodone 7.5 mg-acetaminophen 325 mg tablet   Take 1 tablet 3 times a day by oral route for 30 days. HYDROXYZINE (ATARAX) 25 MG TABLET    hydroxyzine HCl 25 mg tablet   TAKE 1 TABLET EVERY DAY AS NEEDED    KETOROLAC (TORADOL) 10 MG TABLET    Take 1 tablet by mouth every 6 hours as needed for Pain    LIDOCAINE 4 % EXTERNAL PATCH    Lidocaine Pain Relief 4 % topical patch    LISINOPRIL-HYDROCHLOROTHIAZIDE (PRINZIDE;ZESTORETIC) 20-12.5 MG PER TABLET    lisinopril 20 mg-hydrochlorothiazide 12.5 mg tablet    LOSARTAN-HYDROCHLOROTHIAZIDE (HYZAAR) 50-12.5 MG PER TABLET    losartan 50 mg-hydrochlorothiazide 12.5 mg tablet    METFORMIN (GLUCOPHAGE-XR) 500 MG EXTENDED RELEASE TABLET    metformin  mg tablet,extended release 24 hr    METOPROLOL (LOPRESSOR) 100 MG TABLET    metoprolol tartrate 100 mg tablet    METOPROLOL TARTRATE (LOPRESSOR) 50 MG TABLET    Take 50 mg by mouth 2 times daily    OXYCODONE-ACETAMINOPHEN (PERCOCET) 5-325 MG PER TABLET    TAKE 1 TABLET BY MOUTH EVERY 6 HOURS AS NEEDED FOR PAIN FOR UP TO 3 DAYS. MAX 4 TABS DAILY    POLYETHYLENE GLYCOL (GLYCOLAX) 17 GM/SCOOP POWDER    polyethylene glycol 3350 17 gram oral powder packet    POTASSIUM CHLORIDE (KLOR-CON M) 20 MEQ EXTENDED RELEASE TABLET    potassium chloride ER 20 mEq tablet,extended release    QUETIAPINE (SEROQUEL) 25 MG TABLET    Take 25 mg by mouth    SENNA (SENOKOT) 8.6 MG TABLET    Senna Laxative 8.6 mg tablet   2 po qd    TERAZOSIN (HYTRIN) 5 MG CAPSULE    terazosin 5 mg capsule   Take 1 capsule every day by oral route for 30 days. TERBINAFINE (LAMISIL) 1 % CREAM    terbinafine HCl 1 % topical cream    TERBINAFINE (LAMISIL) 250 MG TABLET    terbinafine HCl 250 mg tablet    TRAMADOL (ULTRAM) 50 MG TABLET    tramadol 50 mg tablet   Take 1 tablet every day by oral route for 30 days.     TRIAMCINOLONE (KENALOG) 0.1 % CREAM    triamcinolone acetonide 0.1 % topical cream   APPLY A THIN LAYER TO THE AFFECTED AREA(S) BY TOPICAL ROUTE 2 TIMES PER DAY VENLAFAXINE (EFFEXOR XR) 150 MG EXTENDED RELEASE CAPSULE    venlafaxine  mg capsule,extended release 24 hr   Take 1 capsule twice a day by oral route for 30 days. Vitals signs and nursing note reviewed. Patient Vitals for the past 4 hrs:   Temp Pulse Resp BP SpO2   02/16/23 1112 99.7 °F (37.6 °C) 86 16 (!) 168/104 95 %          Physical Exam  Vitals and nursing note reviewed. Constitutional:       General: He is not in acute distress. Appearance: Normal appearance. He is normal weight. He is not ill-appearing or toxic-appearing. Comments: Generally well-appearing, alert and oriented x4. No acute distress, speaks in clear, appropriate responses, significant dysarthria   HENT:      Head: Normocephalic and atraumatic. Right Ear: External ear normal.      Left Ear: External ear normal.      Nose: Nose normal.      Mouth/Throat:      Mouth: Mucous membranes are moist.   Eyes:      General: No scleral icterus. Right eye: No discharge. Left eye: No discharge. Extraocular Movements: Extraocular movements intact. Cardiovascular:      Rate and Rhythm: Normal rate. Rhythm irregular. Pulses: Normal pulses. Heart sounds: Normal heart sounds. Pulmonary:      Effort: Pulmonary effort is normal. No tachypnea, bradypnea, accessory muscle usage, prolonged expiration or respiratory distress. Breath sounds: Normal breath sounds and air entry. No stridor. No decreased breath sounds, wheezing, rhonchi or rales. Abdominal:      General: Abdomen is flat. There is no distension. Palpations: There is no mass. Tenderness: There is no abdominal tenderness. There is no right CVA tenderness, left CVA tenderness, guarding or rebound. Negative signs include Solorio's sign and McBurney's sign. Hernia: No hernia is present. Musculoskeletal:         General: No swelling, tenderness or deformity. Normal range of motion. Cervical back: Normal range of motion. Skin:     General: Skin is warm. Capillary Refill: Capillary refill takes less than 2 seconds. Neurological:      General: No focal deficit present. Mental Status: He is alert and oriented to person, place, and time. GCS: GCS eye subscore is 4. GCS verbal subscore is 5. GCS motor subscore is 6. Cranial Nerves: Dysarthria and facial asymmetry present. Sensory: Sensory deficit present. Comments: Chronic deficits over the left upper and lower extremities. He has normal sensation and strength in the right side. Psychiatric:         Mood and Affect: Mood normal.        Procedures    No results found for any visits on 02/16/23. CT HEAD WO CONTRAST    (Results Pending)                       Voice dictation software was used during the making of this note. This software is not perfect and grammatical and other typographical errors may be present. This note has not been completely proofread for errors.      Leeanna Burciaga, DO  02/16/23 2815 [facility], DO  02/16/23 2174

## 2023-02-16 NOTE — H&P
Hospitalist History and Physical   Admit Date:  2023 11:10 AM   Name:  Kelvin Zhao   Age:  59 y.o. Sex:  male  :  1965   MRN:  [id number]   [facility]    Presenting Complaint: Chest Pain, Fatigue, Headache, and Hypertension     Reason(s) for Admission: Slurred speech [R47.81]     History of Present Illness:   Trish Ramsey is a 59 y.o. male with medical history of HTN, HIVhyperlipidemia, chronic A-fib, history of CVA with left hemiplegia, anxiety/depression. Presented to ER with a headache- mild, dull and speech problems- mild to moderate  Code S was called. CT head, CTA head and neck no acute findings. Not a candidate for thrombectomy. Ekg -Atrial flutter    Admitted for slurred speech/headache- prob cva      Assessment & Plan: Active Problems:  Slurred speech  -ct head and cta head  negative  Later on today MRI positive for cerebella infarct  Neurology will let us know about starting eliquis. -HTN   As per Neurology no need for permissive blood pressure    -Chronic afib    As per Neurology note ,only aspirin. Eliquis will be started when ok with Neurology    - DM type 2  Sliding  scale insulin    H/O previous cva with left sided hemiplegia      Diet: Diet NPO  VTE prophylaxis: Lovenox  Code status: Full Code    Hospital Problems:  Active Problems:    Slurred speech    Dysarthria    Acute CVA (cerebrovascular accident) ([address]  Resolved Problems:    * No resolved hospital problems. *       Past History:     Past Medical History:   Diagnosis Date    Stroke ([address]        No past surgical history on file. Social History     Tobacco Use    Smoking status: Never    Smokeless tobacco: Never   Substance Use Topics    Alcohol use: Not Currently      Social History     Substance and Sexual Activity   Drug Use Never       No family history on file. There is no immunization history on file for this patient.   Allergies   Allergen Reactions    Amlodipine Shortness Of Breath     Prior to Admit Medications:  Current Outpatient Medications   Medication Instructions    acetaminophen (TYLENOL) 650 mg, Oral, EVERY 4 HOURS PRN    apixaban (ELIQUIS) 5 MG TABS tablet 2 TIMES DAILY    atorvastatin (LIPITOR) 40 MG tablet atorvastatin 40 mg tablet    Baclofen (LIORESAL) 5 MG tablet baclofen 5 mg tablet    baclofen (LIORESAL) 10 mg, Oral, 3 TIMES DAILY    BIKTARVY -25 MG TABS per tablet TAKE 1 TABLET BY MOUTH ONCE DAILY    docusate (COLACE, DULCOLAX) 100 MG CAPS Oral    FLUoxetine (PROZAC) 20 MG capsule fluoxetine 20 mg capsule    Hand[employer]ap [employer] prescription to [facility] with completed form [id number].    hydroCHLOROthiazide (HYDRODIURIL) 12.5 MG tablet hydrochlorothiazide 12.5 mg tablet   Take 1 tablet every day by oral route as directed for 90 days. HYDROcodone-acetaminophen (NORCO) 7.5-325 MG per tablet hydrocodone 7.5 mg-acetaminophen 325 mg tablet   Take 1 tablet 3 times a day by oral route for 30 days. hydrOXYzine (ATARAX) 25 MG tablet hydroxyzine HCl 25 mg tablet   TAKE 1 TABLET EVERY DAY AS NEEDED    ketorolac (TORADOL) 10 mg, Oral, EVERY 6 HOURS PRN    lidocaine 4 % external patch Lidocaine Pain Relief 4 % topical patch    lisinopril-hydroCHLOROthiazide (PRINZIDE;ZESTORETIC) 20-12.5 MG per tablet lisinopril 20 mg-hydrochlorothiazide 12.5 mg tablet    losartan-hydroCHLOROthiazide (HYZAAR) 50-12.5 MG per tablet losartan 50 mg-hydrochlorothiazide 12.5 mg tablet    metFORMIN (GLUCOPHAGE-XR) 500 mg extended release tablet metformin  mg tablet,extended release 24 hr    metoprolol (LOPRESSOR) 100 MG tablet metoprolol tartrate 100 mg tablet    metoprolol tartrate (LOPRESSOR) 50 mg, Oral, 2 TIMES DAILY    oxyCODONE-acetaminophen (PERCOCET) 5-325 MG per tablet TAKE 1 TABLET BY MOUTH EVERY 6 HOURS AS NEEDED FOR PAIN FOR UP TO 3 DAYS. MAX 4 TABS DAILY    polyethylene glycol (GLYCOLAX) 17 GM/SCOOP powder polyethylene glycol 3350 17 gram oral powder packet    potassium chloride (KLOR-CON M) 20 MEQ extended release tablet potassium chloride ER 20 mEq tablet,extended release    QUEtiapine (SEROQUEL) 25 mg, Oral    senna (SENOKOT) 8.6 MG tablet Senna Laxative 8.6 mg tablet   2 po qd    terazosin (HYTRIN) 5 mg capsule terazosin 5 mg capsule   Take 1 capsule every day by oral route for 30 days. terbinafine (LAMISIL) 1 % cream terbinafine HCl 1 % topical cream    terbinafine (LAMISIL) 250 MG tablet terbinafine HCl 250 mg tablet    traMADol (ULTRAM) 50 MG tablet tramadol 50 mg tablet   Take 1 tablet every day by oral route for 30 days. triamcinolone (KENALOG) 0.1 % cream triamcinolone acetonide 0.1 % topical cream   APPLY A THIN LAYER TO THE AFFECTED AREA(S) BY TOPICAL ROUTE 2 TIMES PER DAY    venlafaxine (EFFEXOR XR) 150 MG extended release capsule venlafaxine  mg capsule,extended release 24 hr   Take 1 capsule twice a day by oral route for 30 days. Objective:   Patient Vitals for the past 24 hrs:   Temp Pulse Resp BP SpO2   02/16/23 1510 -- 98 -- -- 93 %   02/16/23 1506 -- 100 -- -- (!) 89 %   02/16/23 1400 -- (!) 119 23 (!) 156/105 (!) 85 %   02/16/23 1355 -- 93 14 (!) 145/99 97 %   02/16/23 1330 -- (!) 106 18 (!) 147/113 97 %   02/16/23 1245 -- 89 15 (!) 123/91 97 %   02/16/23 1215 -- 94 26 (!) 154/107 90 %   02/16/23 1200 -- 90 24 (!) 165/122 98 %   02/16/23 1145 -- -- -- (!) 159/111 --   02/16/23 1131 -- 89 18 (!) 184/119 97 %   02/16/23 1112 99.7 °F (37.6 °C) 86 16 (!) 168/104 95 %       Oxygen Therapy  SpO2: 93 %  Pulse Oximeter Device Mode: Continuous  O2 Device: None (Room air)    Estimated body mass index is 37.66 kg/m² as calculated from the following:    Height as of this encounter: 5' 9\" (1.753 m). Weight as of 9/6/22: 255 lb (115.7 kg). No intake or output data in the 24 hours ending 02/16/23 1625      Physical Exam:    Blood pressure (!) 156/105, pulse 98, temperature 99.7 °F (37.6 °C), temperature source Oral, resp.  rate 23, height 5' 9\" (1.753 m), SpO2 93 %.  General:    Well nourished.  Slurred speech.Not in any cardiopulmonary distress  Head:  Normocephalic, atraumatic  Eyes:  Sclerae appear normal.  Pupils equally round.  ENT:  Nares appear normal.  Moist oral mucosa  Neck:  No restricted ROM.  Trachea midline   CV:   RRR.  No m/r/g.  No jugular venous distension.  Lungs:   CTAB.  No wheezing, rhonchi, or rales.  Symmetric expansion.  Abdomen:   Soft, nontender, nondistended.  Extremities: No cyanosis or clubbing.  No edema  Skin:     No rashes and normal coloration.   Warm and dry.    Neuro:  CN II-XII grossly intact.left hemiplegia  Psych:  Normal mood and affect.      I have personally reviewed labs and tests:  Recent Labs:  Recent Results (from the past 24 hour(s))   EKG 12 Lead    Collection Time: 02/16/23 11:16 AM   Result Value Ref Range    Ventricular Rate 85 BPM    Atrial Rate 181 BPM    P-R Interval 84 ms    QRS Duration 99 ms    Q-T Interval 410 ms    QTc Calculation (Bazett) 476 ms    P Axis 0 degrees    R Axis 245 degrees    T Axis -40 degrees    Diagnosis Possible atrial arrhythmia    POCT Glucose    Collection Time: 02/16/23 11:24 AM   Result Value Ref Range    POC Glucose 89 65 - 100 mg/dL    Performed by: Neil    CBC with Auto Differential    Collection Time: 02/16/23 11:26 AM   Result Value Ref Range    WBC 4.3 4.3 - 11.1 K/uL    RBC 4.93 4.23 - 5.6 M/uL    Hemoglobin 13.9 13.6 - 17.2 g/dL    Hematocrit 41.6 41.1 - 50.3 %    MCV 84.4 82 - 102 FL    MCH 28.2 26.1 - 32.9 PG    MCHC 33.4 31.4 - 35.0 g/dL    RDW 12.7 11.9 - 14.6 %    Platelets 301 150 - 450 K/uL    MPV 10.7 9.4 - 12.3 FL    nRBC 0.00 0.0 - 0.2 K/uL    Differential Type AUTOMATED      Seg Neutrophils 52 43 - 78 %    Lymphocytes 34 13 - 44 %    Monocytes 10 4.0 - 12.0 %    Eosinophils % 2 0.5 - 7.8 %    Basophils 1 0.0 - 2.0 %    Immature Granulocytes 1 0.0 - 5.0 %    Segs Absolute 2.3 1.7 - 8.2 K/UL    Absolute Lymph # 1.5 0.5 - 4.6 K/UL    Absolute Mono # 0.5  0.1 - 1.3 K/UL    Absolute Eos # 0.1 0.0 - 0.8 K/UL    Basophils Absolute 0.0 0.0 - 0.2 K/UL    Absolute Immature Granulocyte 0.0 0.0 - 0.5 K/UL   Comprehensive Metabolic Panel    Collection Time: 02/16/23 11:26 AM   Result Value Ref Range    Sodium 138 133 - 143 mmol/L    Potassium 3.4 (L) 3.5 - 5.1 mmol/L    Chloride 107 101 - 110 mmol/L    CO2 26 21 - 32 mmol/L    Anion Gap 5 2 - 11 mmol/L    Glucose 97 65 - 100 mg/dL    BUN 12 6 - 23 MG/DL    Creatinine 1.20 0.8 - 1.5 MG/DL    Est, Glom Filt Rate >60 >60 ml/min/1.73m2    Calcium 8.9 8.3 - 10.4 MG/DL    Total Bilirubin 0.8 0.2 - 1.1 MG/DL    ALT 20 12 - 65 U/L    AST 26 15 - 37 U/L    Alk Phosphatase 60 50 - 136 U/L    Total Protein 8.3 (H) 6.3 - 8.2 g/dL    Albumin 3.2 (L) 3.5 - 5.0 g/dL    Globulin 5.1 (H) 2.8 - 4.5 g/dL    Albumin/Globulin Ratio 0.6 0.4 - 1.6     Protime-INR    Collection Time: 02/16/23 11:26 AM   Result Value Ref Range    Protime 16.1 (H) 12.6 - 14.3 sec    INR 1.2     Troponin    Collection Time: 02/16/23 11:26 AM   Result Value Ref Range    Troponin, High Sensitivity 87.3 (H) 0 - 14 pg/mL   POCT INR    Collection Time: 02/16/23 11:27 AM   Result Value Ref Range    POC Protime 13.9 (H) 9.6 - 11.6 SECS    POC INR 1.2 0.9 - 1.2     Troponin    Collection Time: 02/16/23  2:03 PM   Result Value Ref Range    Troponin, High Sensitivity 88.2 (H) 0 - 14 pg/mL       I have personally reviewed imaging studies:  CT HEAD WO CONTRAST    Result Date: 2/16/2023  Exam: CT CODE NEURO HEAD WO CONTRAST on 2/16/2023 11:56 AM Clinical History: The Male patient is 59years old  presenting for General malaise with left-sided deficits. Technique: Thin slice axial CT images through the brain were obtained. All CT scans at this facility are performed using dose reduction/dose modulation techniques, as appropriate the performed exam, including the following: Automated Exposure Control;  Adjustment of the mA and/or kV according to patient size (this includes techniques or standardized protocols for targeted exams where dose is matched to indication/reason for exam); and Use of Iterative Reconstruction Technique. Radiation Exposure Indices: Reference Air Kerma (Ka,r) = 1225 mGy-cm Comparison:  None. Findings:  Cerebrum: Remote encephalomalacic changes throughout right medial temporal lobe and insula with focal encephalomalacia which extends into the corona radiata. Otherwise mild chronic periventricular white matter disease. No evidence of intracranial hemorrhage, mass, or other space-occupying lesion is seen. There are no abnormal extra-axial fluid collections. Cerebellum: Normal cerebellar morphology is demonstrated. CSF spaces: The ventricular system is within normal limits. The basilar cisterns are unremarkable. Brainstem: No evidence of ischemia, hemorrhage, or mass. Extracranial tissues: Visualized orbits and extracranial soft tissues are unremarkable. Paranasal sinuses/Mastoids: Well-pneumatized and aerated. . Calvarium: No acute osseous abnormality. 1.  Remote medial right temporal and insular ischemic changes otherwise chronic microvascular disease. CPT code 81577    CT BRAIN PERFUSION    Result Date: 2/16/2023  Exam: CT BRAIN PERFUSION on 2/16/2023 12:25 PM Clinical History: The Male patient is 59years old  presenting for Dysarthria. COMPARISON: Head CT 2/16/2023 TECHNIQUE: CT perfusion of the brain was obtained after the administration of intravenous contrast.  Perfusion maps and perfusion analysis output were generated using the VIZ ContaCt perfusion processing software algorithm for LVO detection. Radiation dose reduction techniques were used for this study: All CT scans performed at this facility use one or all of the following: Automated exposure control, adjustment of the mA and/or kVp according to patient's size, iterative reconstruction. Total radiation dose: 2139 mGy-cm FINDINGS: Study is technically adequate.  Adequate vascular enhancement is demonstrated. RAPID Output Values: CBF < 30% volume (best correlation with core infarct volume without overcalls): 0 ml (core infarction volume greater than 50 cc associated with poor outcomes) Tmax > 6 seconds: 28 ml Tmax/CBF Mismatch Volume: 28 ml Tmax/CBF Mismatch Ratio: None Hypoperfusion Intensity Ratio (Tmax > 10 seconds / Tmax > 6 seconds): 0 (values greater than 0.5 associated with poor outcome) Tmax > 10 seconds Volume: 0 ml (volume greater than 100 mL is associated with poor outcome)     1. No evidence of core CT cerebral perfusion defect. 2. Hypoperfusion changes involving the right cerebellum and occipital lobe. Please note that the determination of patient treatment is not based solely upon imaging factors or calculation values. Management of ischemia is at the discretion of the primary physician and is based upon a combination of clinical and imaging data, along other factors. CTA HEAD NECK W CONTRAST    Result Date: 2/16/2023  HISTORY: 59years of age Male with slurred speech and weakness. History of stroke. EXAM: CTA HEAD NECK W CONTRAST. Radiation reduction dose techniques were used for the study. Our CT scanner use one or all of the following- Automated exposure control, adjustment of the mA and/or KV according the patient size, iterative reconstruction. 3-D multiplanar reformations were performed at the workstation. All carotid artery stenosis percentages are based upon NASCET criteria if appropriate. Contrast: 100 ml of Isovue-370 intravenous contrast was administered. Per the CT technologist, the study was analyzed by the [address] ai algorithm. The radiologists have been asked by the hospital administration to designate when CTA studies are sent to [address] ai even though this algorithm is not used by the radiologist for exam interpretation. COMPARISON: No prior vascular imaging of the head or neck available. FINDINGS: VASCULAR FINDINGS: Cervical CTA: Visualized Aorta:  There is a three-vessel branching pattern of the aortic arch. No significant atherosclerotic disease of the aortic arch. Right Subclavian Artery: Grossly patent. Left Subclavian Artery: Grossly Patent. Right Carotid Arteries: Common Carotid Artery: Patent without significant stenosis. External Carotid Artery: Grossly patent. Carotid Bulb and Proximal ICA: No significant atherosclerotic disease or stenosis. Cervical Right ICA: Patent. Vertebral Artery Dominance: Codominant. Cervical Right Vertebral Artery: Patent. Left Carotid Arteries: Common Carotid Artery: Patent without significant stenosis. External Carotid Artery: Grossly patent. Carotid Bulb and Proximal ICA: No significant atherosclerotic disease or stenosis. Cervical Left ICA: Patent. Cervical Left Vertebral Artery: Patent. Cranial CTA: Right Internal Carotid Artery: Patent. Left Internal Carotid Artery: Patent. Intracranial ICA Atherosclerotic Disease: There is no significant atherosclerotic disease of the bilateral ICAs. Anterior Cerebral Arteries: Right A1 Segment: Patent. Left A1: Patent. Bilateral A2 and A3 segments appear grossly patent. Right Middle Cerebral Arteries: Right M1 segment is patent. Major proximal MCA branches beyond the bifurcation are patent. Left Middle Cerebral Arteries: Left M1 segment is patent. Major proximal MCA branches beyond the bifurcation are patent. Right Vertebral Artery: Patent. Left Vertebral Artery: Patent. Basilar Artery: Patent. Right Posterior Cerebral Artery: Patent. Left Posterior Cerebral Artery: Patent. Intracranial Aneurysms: None visualized. . Intracranial Proximal Vessel Occlusion: None. Major Dural Venous Sinuses: Grossly patent where adequately visualized. NONVASCULAR FINDINGS: Head: Brain Parenchyma: Findings of old right basal ganglia and insular infarct. No evidence of intracranial hemorrhage. Orbital Structures: Grossly unremarkable.  Calvarial/Maxillofacial Soft Tissues: No evidence of significant acute abnormality in the calvarial or maxillofacial soft tissues. Neck: Thyroid gland:Subcentimeter hypodense nodule in the right thyroid lobe. Cervical and Upper Mediastinal Adenopathy: No significant adenopathy. Upper Lungs: Without areas of prominent focal consolidation or masses. OSSEOUS STRUCTURES: Mastoid Air Cells: Unremarkable. Paranasal Sinuses: No evidence of significant mucoperiosteal thickening. Cervical Spine: Mild degenerative changes of the mid to lower cervical spine. 1. Negative CTA exam of the major cervical arterial vasculature for significant stenosis or occlusion. 2. Negative CTA exam of the major intracranial arterial vasculature for significant proximal vessel stenosis, occlusion, or aneurysms. 3. Findings of old right basal ganglia and right insular infarct. Echocardiogram:  No results found for this or any previous visit.         Orders Placed This Encounter   Medications    iopamidol (ISOVUE-370) 76 % injection 100 mL    OR Linked Order Group     ondansetron (ZOFRAN-ODT) disintegrating tablet 4 mg     ondansetron (ZOFRAN) injection 4 mg    polyethylene glycol (GLYCOLAX) packet 17 g    DISCONTD: enoxaparin (LOVENOX) injection 40 mg     Order Specific Question:   Indication of Use     Answer:   Prophylaxis-DVT/PE    OR Linked Order Group     aspirin EC tablet 81 mg     aspirin suppository 300 mg    atorvastatin (LIPITOR) tablet 80 mg    labetalol (NORMODYNE;TRANDATE) injection 10 mg    acetaminophen (TYLENOL) tablet 650 mg    DISCONTD: atorvastatin (LIPITOR) tablet 80 mg    bictegravir-emtricitab-tenofovir alafenamide (BIKTARVY) -25 MG per tablet 1 tablet    FLUoxetine (PROZAC) capsule 20 mg    HYDROcodone-acetaminophen (NORCO) 7.5-325 MG per tablet 1 tablet    QUEtiapine (SEROQUEL) tablet 25 mg    venlafaxine (EFFEXOR XR) extended release capsule 150 mg    enoxaparin Sodium (LOVENOX) injection 30 mg     Order Specific Question:   Indication of Use     Answer:   Prophylaxis-DVT/PE    metoprolol tartrate (LOPRESSOR) tablet 50 mg    insulin lispro (HUMALOG) injection vial 0-4 Units    insulin lispro (HUMALOG) injection vial 0-4 Units    glucose chewable tablet 16 g    OR Linked Order Group     dextrose bolus 10% 125 mL     dextrose bolus 10% 250 mL    glucagon (rDNA) injection 1 mg    dextrose 10 % infusion         Signed:  Carmella Edwards MD

## 2023-02-16 NOTE — ED TRIAGE NOTES
~6/7 am reports general malaise, hx stroke 2021 with deficits to L side. States had a \"mild heart attack\" 10 days @ [facility]. Hypertensive per EMS. Headache. 1 in nitro paste applied by EMS. Vomited 1x for EMS, given 4 mg zofran IV.

## 2023-02-16 NOTE — ED NOTES
Pt states he does not know his home medication, that his caregiver is the one who takes care of that.      Christina Carrion RN  02/16/23 0591

## 2023-02-16 NOTE — ED NOTES
Report to Erica LOVE and Gloria LOVE. Bedside NIHSS completed at handoff     Teofilo Escobedo RN  02/16/23 0187

## 2023-02-16 NOTE — CONSULTS
Consult    Patient: Kiera Paez MRN: [id number]     YOB: 1965  Age: 59 y.o. Sex: male      Subjective:      Kiera Paez is a 59 y.o. male who is being seen for code S..  The patient presents to the emergency department with acute onset dysarthria. He has a history of stroke and mechanical thrombectomy. The patient takes Eliquis for paroxysmal atrial fibrillation. He has been out of his medication for a week. He states that his primary care physician refused to prescribe the medication because he missed his appointment. The patient was last known normal at 1 hour prior to arrival to the emergency department. The patient presented to [facility] ED. A code S was called at 11:20 AM. Neurology arrived to the bedside at 11:25 AM. Initial NIHSS was as below. A CT of the head was obtained and does not show acute intracranial abnormalities. Past Medical History:   Diagnosis Date    Stroke [facility])      No past surgical history on file. No family history on file.   Social History     Tobacco Use    Smoking status: Never    Smokeless tobacco: Never   Substance Use Topics    Alcohol use: Not Currently      Current Facility-Administered Medications   Medication Dose Route Frequency Provider Last Rate Last Admin    ondansetron (ZOFRAN-ODT) disintegrating tablet 4 mg  4 mg Oral Q8H PRN Zee Younger MD        Or    ondansetron (ZOFRAN) injection 4 mg  4 mg IntraVENous Q6H PRN Zee Younger MD        polyethylene glycol (GLYCOLAX) packet 17 g  17 g Oral Daily PRN Zee Younger MD        enoxaparin (LOVENOX) injection 40 mg  40 mg SubCUTAneous Daily Zee Younger MD        aspirin EC tablet 81 mg  81 mg Oral Daily Zee Younger MD        Or    aspirin suppository 300 mg  300 mg Rectal Daily Zee Younger MD        atorvastatin (LIPITOR) tablet 80 mg  80 mg Oral Nightly Zee Younger MD        labetalol (NORMODYNE;TRANDATE) injection 10 mg  10 mg IntraVENous Q10 Min PRN Chito Cormier MD        acetaminophen (TYLENOL) tablet 650 mg  650 mg Oral Q4H PRN Chito Cormier MD        atorvastatin (LIPITOR) tablet 80 mg  80 mg Oral Nightly Chito Cormier MD        bictegravir-emtricitab-tenofovir alafenamide (BIKTARVY) -25 MG per tablet 1 tablet  1 tablet Oral Daily Chito Cormier MD        FLUoxetine (PROZAC) capsule 20 mg  20 mg Oral Daily Chito Cormier MD        HYDROcodone-acetaminophen (NORCO) 7.5-325 MG per tablet 1 tablet  1 tablet Oral Q8H PRN Chito Cormier MD        QUEtiapine (SEROQUEL) tablet 25 mg  25 mg Oral Nightly Chito Cormier MD        venlafaxine (EFFEXOR XR) extended release capsule 150 mg  150 mg Oral BID Chito Cormier MD         Current Outpatient Medications   Medication Sig Dispense Refill    baclofen (LIORESAL) 10 MG tablet Take 1 tablet by mouth 3 times daily 15 tablet 0    ketorolac (TORADOL) 10 MG tablet Take 1 tablet by mouth every 6 hours as needed for Pain 10 tablet 0    [facility] with completed form [id number].      acetaminophen (TYLENOL) 325 mg tablet Take 650 mg by mouth every 4 hours as needed      apixaban (ELIQUIS) 5 MG TABS tablet 2 times daily      atorvastatin (LIPITOR) 40 MG tablet atorvastatin 40 mg tablet      Baclofen (LIORESAL) 5 MG tablet baclofen 5 mg tablet      BIKTARVY -25 MG TABS per tablet TAKE 1 TABLET BY MOUTH ONCE DAILY      docusate (COLACE, DULCOLAX) 100 MG CAPS Take by mouth      FLUoxetine (PROZAC) 20 MG capsule fluoxetine 20 mg capsule      hydroCHLOROthiazide (HYDRODIURIL) 12.5 MG tablet hydrochlorothiazide 12.5 mg tablet   Take 1 tablet every day by oral route as directed for 90 days. HYDROcodone-acetaminophen (NORCO) 7.5-325 MG per tablet hydrocodone 7.5 mg-acetaminophen 325 mg tablet   Take 1 tablet 3 times a day by oral route for 30 days.       hydrOXYzine (ATARAX) 25 MG tablet hydroxyzine HCl 25 mg tablet   TAKE 1 TABLET EVERY DAY AS NEEDED lidocaine 4 % external patch Lidocaine Pain Relief 4 % topical patch      lisinopril-hydroCHLOROthiazide (PRINZIDE;ZESTORETIC) 20-12.5 MG per tablet lisinopril 20 mg-hydrochlorothiazide 12.5 mg tablet      losartan-hydroCHLOROthiazide (HYZAAR) 50-12.5 MG per tablet losartan 50 mg-hydrochlorothiazide 12.5 mg tablet      metFORMIN (GLUCOPHAGE-XR) 500 mg extended release tablet metformin  mg tablet,extended release 24 hr      metoprolol tartrate (LOPRESSOR) 50 MG tablet Take 50 mg by mouth 2 times daily      metoprolol (LOPRESSOR) 100 MG tablet metoprolol tartrate 100 mg tablet      oxyCODONE-acetaminophen (PERCOCET) 5-325 MG per tablet TAKE 1 TABLET BY MOUTH EVERY 6 HOURS AS NEEDED FOR PAIN FOR UP TO 3 DAYS. MAX 4 TABS DAILY      polyethylene glycol (GLYCOLAX) 17 GM/SCOOP powder polyethylene glycol 3350 17 gram oral powder packet      potassium chloride (KLOR-CON M) 20 MEQ extended release tablet potassium chloride ER 20 mEq tablet,extended release      QUEtiapine (SEROQUEL) 25 MG tablet Take 25 mg by mouth      senna (SENOKOT) 8.6 MG tablet Senna Laxative 8.6 mg tablet   2 po qd      terazosin (HYTRIN) 5 mg capsule terazosin 5 mg capsule   Take 1 capsule every day by oral route for 30 days. terbinafine (LAMISIL) 1 % cream terbinafine HCl 1 % topical cream      terbinafine (LAMISIL) 250 MG tablet terbinafine HCl 250 mg tablet      traMADol (ULTRAM) 50 MG tablet tramadol 50 mg tablet   Take 1 tablet every day by oral route for 30 days. triamcinolone (KENALOG) 0.1 % cream triamcinolone acetonide 0.1 % topical cream   APPLY A THIN LAYER TO THE AFFECTED AREA(S) BY TOPICAL ROUTE 2 TIMES PER DAY      venlafaxine (EFFEXOR XR) 150 MG extended release capsule venlafaxine  mg capsule,extended release 24 hr   Take 1 capsule twice a day by oral route for 30 days.           Allergies   Allergen Reactions    Amlodipine Shortness Of Breath       Review of Systems:  Not obtained due to emergent situation Objective:     Vitals:    02/16/23 1245 02/16/23 1330 02/16/23 1355 02/16/23 1400   BP: (!) 123/91 (!) 147/113 (!) 145/99 (!) 156/105   Pulse: 89 (!) 106 93 (!) 119   Resp: 15 18 14 23   Temp:       TempSrc:       SpO2: 97% 97% 97% (!) 85%   Height:            Physical Exam:  General - Well developed, well nourished, in no apparent distress. Pleasant and conversant. Severe dysarthria  HEENT - Normocephalic, atraumatic. Conjunctiva, tympanic membranes, and oropharynx are clear. Neck - Supple without masses, no bruits   Cardiovascular - Regular rate and rhythm. Normal S1, S2 without murmurs, rubs, or gallops. Lungs - Clear to auscultation. Abdomen - Soft, nontender with normal bowel sounds. Extremities - Peripheral pulses intact. No edema and no rashes. NIHSS   NIHSS Score: 9 (baseline NIH stroke scale is 7)  1a-Level of Consciousness 0  1b-What is Month/Age 0  1c-Open/Close Eyes&Hand 0  2 -Best Gaze 0  3 -Visual Fields 0  4 -Facial Palsy 0  5a-Motor-Left Arm 4  5b-Motor-Right Arm 0  6a-Motor-Left Leg 3  6b-Motor-Right Leg 0  7 -Limb Ataxia 0  8 -Sensory 0  9 -Best Language 0  10-Dysarthria 2  11-Extinction/Inattention 0    CT personally reviewed. Results do not populate into note. I personally reviewed the patient's CT perfusion, CTA, and CT. Please see reports for details    Assessment:     49-year-old man seen as a code S with severe dysarthria. Initial NIHSS was 9 with a baseline stroke scale of around 7. CT of the head was obtained and does not show acute intracranial abnormalities. CTA of head neck was obtained no LVO. The patient was not a candidate for tenecteplase because his stroke scale is only 2 points above baseline . The patient was not a candidate for mechanical thrombectomy because of of no large vessel occlusion on CTA    This may represent a new stroke while off Eliquis. Recrudescence of prior stroke symptoms resulting in dysarthria is also possible.     Plan:     MRI of the brain    Aspirin for now    Restarting Eliquis will be determined after MRI of the brain    High intensity statin    Permissive hypertension.   There is no need to hold the patient's antihypertensive medications unless he is hypotensive    Further recommendations to follow    Signed By: Jayden Rogel DO     February 16, 2023      Critical care time: 35 minutes for a life-threatening neurological condition, acute ischemic stroke

## 2023-02-17 LAB
CHOLEST SERPL-MCNC: 134 MG/DL
ERYTHROCYTE [DISTWIDTH] IN BLOOD BY AUTOMATED COUNT: 12.6 % (ref 11.9–14.6)
EST. AVERAGE GLUCOSE BLD GHB EST-MCNC: 134 MG/DL
GLUCOSE BLD STRIP.AUTO-MCNC: 100 MG/DL (ref 65–100)
GLUCOSE BLD STRIP.AUTO-MCNC: 130 MG/DL (ref 65–100)
GLUCOSE BLD STRIP.AUTO-MCNC: 92 MG/DL (ref 65–100)
GLUCOSE BLD STRIP.AUTO-MCNC: 98 MG/DL (ref 65–100)
HBA1C MFR BLD: 6.3 % (ref 4.8–5.6)
HCT VFR BLD AUTO: 42.2 % (ref 41.1–50.3)
HDLC SERPL-MCNC: 35 MG/DL (ref 40–60)
HDLC SERPL: 3.8
HGB BLD-MCNC: 14.4 G/DL (ref 13.6–17.2)
LDLC SERPL CALC-MCNC: 87.2 MG/DL
MCH RBC QN AUTO: 28.4 PG (ref 26.1–32.9)
MCHC RBC AUTO-ENTMCNC: 34.1 G/DL (ref 31.4–35)
MCV RBC AUTO: 83.2 FL (ref 82–102)
NRBC # BLD: 0 K/UL (ref 0–0.2)
PLATELET # BLD AUTO: 301 K/UL (ref 150–450)
PMV BLD AUTO: 11.2 FL (ref 9.4–12.3)
RBC # BLD AUTO: 5.07 M/UL (ref 4.23–5.6)
SERVICE CMNT-IMP: ABNORMAL
SERVICE CMNT-IMP: NORMAL
TRIGL SERPL-MCNC: 59 MG/DL (ref 35–150)
VLDLC SERPL CALC-MCNC: 11.8 MG/DL (ref 6–23)
WBC # BLD AUTO: 4.3 K/UL (ref 4.3–11.1)

## 2023-02-17 PROCEDURE — 1100000000 HC RM PRIVATE

## 2023-02-17 PROCEDURE — 6370000000 HC RX 637 (ALT 250 FOR IP): Performed by: HOSPITALIST

## 2023-02-17 PROCEDURE — 80061 LIPID PANEL: CPT

## 2023-02-17 PROCEDURE — 82962 GLUCOSE BLOOD TEST: CPT

## 2023-02-17 PROCEDURE — 85027 COMPLETE CBC AUTOMATED: CPT

## 2023-02-17 PROCEDURE — 97530 THERAPEUTIC ACTIVITIES: CPT

## 2023-02-17 PROCEDURE — 36415 COLL VENOUS BLD VENIPUNCTURE: CPT

## 2023-02-17 PROCEDURE — 83036 HEMOGLOBIN GLYCOSYLATED A1C: CPT

## 2023-02-17 PROCEDURE — 6370000000 HC RX 637 (ALT 250 FOR IP): Performed by: FAMILY MEDICINE

## 2023-02-17 PROCEDURE — 97535 SELF CARE MNGMENT TRAINING: CPT

## 2023-02-17 PROCEDURE — 97161 PT EVAL LOW COMPLEX 20 MIN: CPT

## 2023-02-17 PROCEDURE — 97112 NEUROMUSCULAR REEDUCATION: CPT

## 2023-02-17 PROCEDURE — 97165 OT EVAL LOW COMPLEX 30 MIN: CPT

## 2023-02-17 PROCEDURE — 99232 SBSQ HOSP IP/OBS MODERATE 35: CPT | Performed by: PSYCHIATRY & NEUROLOGY

## 2023-02-17 PROCEDURE — 92610 EVALUATE SWALLOWING FUNCTION: CPT

## 2023-02-17 RX ORDER — ASPIRIN 81 MG/1
81 TABLET ORAL DAILY
Status: DISCONTINUED | OUTPATIENT
Start: 2023-02-18 | End: 2023-02-19

## 2023-02-17 RX ORDER — POTASSIUM CHLORIDE 20 MEQ/1
40 TABLET, EXTENDED RELEASE ORAL
Status: COMPLETED | OUTPATIENT
Start: 2023-02-17 | End: 2023-02-18

## 2023-02-17 RX ADMIN — METOPROLOL TARTRATE 50 MG: 50 TABLET, FILM COATED ORAL at 10:01

## 2023-02-17 RX ADMIN — ATORVASTATIN CALCIUM 80 MG: 80 TABLET, FILM COATED ORAL at 22:23

## 2023-02-17 RX ADMIN — APIXABAN 5 MG: 5 TABLET, FILM COATED ORAL at 10:36

## 2023-02-17 RX ADMIN — VENLAFAXINE HYDROCHLORIDE 150 MG: 37.5 CAPSULE, EXTENDED RELEASE ORAL at 22:23

## 2023-02-17 RX ADMIN — BICTEGRAVIR SODIUM, EMTRICITABINE, AND TENOFOVIR ALAFENAMIDE FUMARATE 1 TABLET: 50; 200; 25 TABLET ORAL at 12:29

## 2023-02-17 RX ADMIN — VENLAFAXINE HYDROCHLORIDE 150 MG: 37.5 CAPSULE, EXTENDED RELEASE ORAL at 10:01

## 2023-02-17 RX ADMIN — METOPROLOL TARTRATE 50 MG: 50 TABLET, FILM COATED ORAL at 22:23

## 2023-02-17 RX ADMIN — FLUOXETINE 20 MG: 10 CAPSULE ORAL at 10:02

## 2023-02-17 RX ADMIN — POTASSIUM CHLORIDE 40 MEQ: 1500 TABLET, EXTENDED RELEASE ORAL at 10:10

## 2023-02-17 RX ADMIN — QUETIAPINE FUMARATE 25 MG: 25 TABLET ORAL at 22:23

## 2023-02-17 ASSESSMENT — PAIN SCALES - GENERAL
PAINLEVEL_OUTOF10: 0
PAINLEVEL_OUTOF10: 0

## 2023-02-17 NOTE — DISCHARGE INSTRUCTIONS

## 2023-02-17 NOTE — PROGRESS NOTES
LTG: patient will tolerate safest, least restrictive oral diet without s/sx airway compromise  STG: Patient will tolerate soft/bite sized diet and thin liquids without overt s/sx aspiration  STG: Patient will tolerate ongoing po trials in efforts to advance diet  STG: Patient will participate in modified barium swallow study to objectively assess oropharyngeal swallow if indicated        SPEECH LANGUAGE PATHOLOGY: DYSPHAGIA  Initial Assessment    NAME: Meenakshi Zhao  : 1965  MRN: [id number]    ADMISSION DATE: 2023  PRIMARY DIAGNOSIS: <principal problem not specified>  Dysarthria [R47.1]  Slurred speech [R47.81]  Chest pain, unspecified type [R07.9]    ICD-10: Treatment Diagnosis: R13.12 Dysphagia, Oropharyngeal Phase    RECOMMENDATIONS   Diet:  Diet Solids Recommendation: Soft & Bite Sized  Liquid Consistency Recommendation: Thin    Medications: Meds in puree     Recommendations: Dysphagia treatment;Assistance with meals    Therapeutic Interventions: Patient/Family education;Diet tolerance monitoring   Compensatory Swallowing Strategies: Upright as possible for all oral intake;Small bites/sips; Check for pocketing of food on the Left   Patient continues to require skilled intervention: Yes  D/C Recommendations: Ongoing speech therapy is recommended during this hospitalization, Ongoing speech therapy is recommended at next level of care     ASSESSMENT    Dysphagia Diagnosis: Mild oral stage dysphagia  Patient with slowed mastication and oral clearing with chewables. Masticating solids on right side due to left sided facial droop. Functional oral clearing with all textures given increased time. No pharyngeal dysphagia symptoms identified. Recommend soft/bite sized diet and thin liquids. Meds floated in puree. Needs assistance with meals. Will continue to follow for diet tolerance/po trials for diet advancement and speech-language/cognitive evaluation later this hospitalization.      GENERAL History of Present Injury/Illness: Mr. Beltran Feliciano  has a past medical history of Stroke [facility]). . He also  has no past surgical history on file. Chart Reviewed: Yes  Subjective: Patient is alert upright in bed for assessment. Pleasant and talkative. Speech is dysarthric with blended word boundaries and reduced precision. Daughter at bedside. Behavior/Cognition: Alert; Cooperative;Pleasant mood  Communication Observation: Dysarthria  Follows Directions: Complex    Prior Dysphagia History: Per chart review, patient with history of oral dysphagia after CVA in January 2021. On a puree diet/thin liquids while at [facility]. Patient/family report patient advanced back to regular diet. Current Diet : Regular  Current Liquid Diet : Thin    Respiratory Status: Room air     O2 Device: None (Room air)        Pain:   Patient does not c/o pain, Patient does not appear in pain                                        OBJECTIVE    Patient Positioning: Upright in bed   Oral Motor   Labial: Left droop  Dentition: Limited;Natural  Oral Hygiene: Moist  Lingual: Left deviation  Dentition: Some missing teeth    -Patient reports having slight left sided facial droop at baseline from prior stroke. Denies history of dysarthia. Baseline Vocal Quality: Normal    Oropharyngeal Phase:  Patient presented with ~ 6 oz  thin by cup/straw/serial sips, puree across 10 trials, mixed fruit across 8 trials, and cracker across 4 trials. Functional oral prep with liquid and puree. Slowed effortful mastication of chewables, but adequately clear oral cavity given increased time. Independently masticates solids on right side of oral cavity and reports need to check left side for residue. No overt s/sx airway compromise with any trials.                      PLAN    Duration/Frequency: Continue to follow patient 3x/week for duration of hospitalization and/or until goals met    Dysphagia Outcome and Severity Scale (FAN)  Dysphagia Outcome Severity Scale: Level 5: Mild dysphagia- Distant supervision. May need one diet consistency restricted  Interpretation of Tool: The Dysphagia Outcome and Severity Scale (FAN) is a simple, easy-to-use, 7-point scale developed to systematically rate the functional severity of dysphagia based on objective assessment and make recommendations for diet level, independence level, and type of nutrition.    Normal(7), Functional(6), Mild(5), Mild-Moderate(4), Moderate(3), Moderate-Severe(2), Severe(1)    Speech Therapy Prognosis  Prognosis: Excellent    Education: Patient, Family member, RN  Patient Education: SLP role, dysphagia diet, safe swallowing precautions  Patient Education Response: Verbalizes understanding, Needs reinforcement    PRECAUTIONS/ALLERGIES: Amlodipine   Safety Devices in place: Yes  Type of devices: Left in bed;Call light within reach  Restraints Initially in Place: No        Therapy Time  SLP Individual Minutes  Time In: 9252  Time Out: 8886  Minutes: [address] [place], [place]  2/17/2023 9:58 AM

## 2023-02-17 NOTE — PROGRESS NOTES
Hospitalist   Admit Date:  2023 11:10 AM   Name:  Maria Del Rosario Zhao   Age:  59 y.o. Sex:  male  :  1965   MRN:  [id number]   Room:        History of Present Illness:     59 y.o. male with medical history of HTN, HIVhyperlipidemia, chronic A-fib, history of CVA with left hemiplegia, anxiety/depression. Presented to ER with a headache- mild, dull and speech problems- mild to moderate  Code S was called. CT head, CTA head and neck no acute findings. Not a candidate for thrombectomy. Ekg -Atrial flutter  Admitted for slurred speech/headache- prob cva    Today, speech abnormality has not improved but take PO, daughter at bedside    Assessment & Plan:     1- Acute right ischemic cerebellar infarct, with slurred speech, has hx of stroke in the past with residual left weakness (arm>leg). Hx of afib and ran out of Eliquis for 1 week pre-admission.   2- HTN, fairly controlled  As per Neurology no need for permissive blood pressure  3- DM type 2, controlled    Rx:  Eliquis on   Neuro monitoring  BP control  Insulin scale  ASA  Statin     Dispo; home when ok w/ neurology    Objective:   Patient Vitals for the past 24 hrs:   Temp Pulse Resp BP SpO2   23 0800 97.9 °F (36.6 °C) 76 17 (!) 160/122 94 %   23 0400 97.9 °F (36.6 °C) 79 17 (!) 163/113 98 %   23 0000 98.1 °F (36.7 °C) 65 17 (!) 150/103 100 %   23 2134 97.3 °F (36.3 °C) 75 18 (!) 155/110 99 %   23 -- -- -- (!) 145/108 --   23 -- -- -- (!) 148/103 --   23 -- -- -- (!) 141/97 --   23 -- -- -- (!) 153/96 --   23 99 °F (37.2 °C) 98 18 (!) 145/108 93 %   23 1931 -- -- -- (!) 135/99 --   23 1916 -- -- -- (!) 157/105 --   23 1901 -- -- -- (!) 163/145 --   23 1846 -- -- -- (!) 155/123 --   23 1510 -- 98 -- -- 93 %   23 1506 -- 100 -- -- (!) 89 %   23 1438 -- (!) 141 18 -- 99 %   23 1400 -- (!) 119 23 (!) 156/105 (!) 85 %   02/16/23 1355 -- 93 14 (!) 145/99 97 %   02/16/23 1330 -- (!) 106 18 (!) 147/113 97 %   02/16/23 1245 -- 89 15 (!) 123/91 97 %   02/16/23 1215 -- 94 26 (!) 154/107 90 %   02/16/23 1200 -- 90 24 (!) 165/122 98 %   02/16/23 1145 -- -- -- (!) 159/111 --   02/16/23 1131 -- 89 18 (!) 184/119 97 %   02/16/23 1112 99.7 °F (37.6 °C) 86 16 (!) 168/104 95 %         Oxygen Therapy  SpO2: 94 %  Pulse Oximeter Device Mode: Continuous  O2 Device: None (Room air)    Estimated body mass index is 36.62 kg/m² as calculated from the following:    Height as of this encounter: 5' 9\" (1.753 m). Weight as of this encounter: 248 lb (112.5 kg). Intake/Output Summary (Last 24 hours) at 2/17/2023 0949  Last data filed at 2/17/2023 0400  Gross per 24 hour   Intake --   Output 400 ml   Net -400 ml           Physical Exam:    Blood pressure (!) 160/122, pulse 76, temperature 97.9 °F (36.6 °C), temperature source Oral, resp. rate 17, height 5' 9\" (1.753 m), weight 248 lb (112.5 kg), SpO2 94 %. General:    Well nourished. Slurred speech. Not in any cardiopulmonary distress  CV:   RRR. No m/r/g. Lungs:   CTAB. No wheezing, rhonchi, or rales. Symmetric expansion. Abdomen:   Soft, nontender, nondistended. Extremities: No cyanosis or clubbing. No edema  Skin:     No rashes and normal coloration. Warm and dry. Neuro:  left hemiplegia arm 0/5, leg 3/5  Psych:  Normal mood and affect.       I have personally reviewed labs and tests:  Recent Labs:  Recent Results (from the past 24 hour(s))   EKG 12 Lead    Collection Time: 02/16/23 11:16 AM   Result Value Ref Range    Ventricular Rate 85 BPM    Atrial Rate 181 BPM    P-R Interval 84 ms    QRS Duration 99 ms    Q-T Interval 410 ms    QTc Calculation (Bazett) 476 ms    P Axis 0 degrees    R Axis 245 degrees    T Axis -40 degrees    Diagnosis Atrial fibrillation    POCT Glucose    Collection Time: 02/16/23 11:24 AM   Result Value Ref Range    POC Glucose 89 65 - 100 mg/dL Performed by: Breana Glynn    CBC with Auto Differential    Collection Time: 02/16/23 11:26 AM   Result Value Ref Range    WBC 4.3 4.3 - 11.1 K/uL    RBC 4.93 4.23 - 5.6 M/uL    Hemoglobin 13.9 13.6 - 17.2 g/dL    Hematocrit 41.6 41.1 - 50.3 %    MCV 84.4 82 - 102 FL    MCH 28.2 26.1 - 32.9 PG    MCHC 33.4 31.4 - 35.0 g/dL    RDW 12.7 11.9 - 14.6 %    Platelets 555 321 - 604 K/uL    MPV 10.7 9.4 - 12.3 FL    nRBC 0.00 0.0 - 0.2 K/uL    Differential Type AUTOMATED      Seg Neutrophils 52 43 - 78 %    Lymphocytes 34 13 - 44 %    Monocytes 10 4.0 - 12.0 %    Eosinophils % 2 0.5 - 7.8 %    Basophils 1 0.0 - 2.0 %    Immature Granulocytes 1 0.0 - 5.0 %    Segs Absolute 2.3 1.7 - 8.2 K/UL    Absolute Lymph # 1.5 0.5 - 4.6 K/UL    Absolute Mono # 0.5 0.1 - 1.3 K/UL    Absolute Eos # 0.1 0.0 - 0.8 K/UL    Basophils Absolute 0.0 0.0 - 0.2 K/UL    Absolute Immature Granulocyte 0.0 0.0 - 0.5 K/UL   Comprehensive Metabolic Panel    Collection Time: 02/16/23 11:26 AM   Result Value Ref Range    Sodium 138 133 - 143 mmol/L    Potassium 3.4 (L) 3.5 - 5.1 mmol/L    Chloride 107 101 - 110 mmol/L    CO2 26 21 - 32 mmol/L    Anion Gap 5 2 - 11 mmol/L    Glucose 97 65 - 100 mg/dL    BUN 12 6 - 23 MG/DL    Creatinine 1.20 0.8 - 1.5 MG/DL    Est, Glom Filt Rate >60 >60 ml/min/1.73m2    Calcium 8.9 8.3 - 10.4 MG/DL    Total Bilirubin 0.8 0.2 - 1.1 MG/DL    ALT 20 12 - 65 U/L    AST 26 15 - 37 U/L    Alk Phosphatase 60 50 - 136 U/L    Total Protein 8.3 (H) 6.3 - 8.2 g/dL    Albumin 3.2 (L) 3.5 - 5.0 g/dL    Globulin 5.1 (H) 2.8 - 4.5 g/dL    Albumin/Globulin Ratio 0.6 0.4 - 1.6     Protime-INR    Collection Time: 02/16/23 11:26 AM   Result Value Ref Range    Protime 16.1 (H) 12.6 - 14.3 sec    INR 1.2     Troponin    Collection Time: 02/16/23 11:26 AM   Result Value Ref Range    Troponin, High Sensitivity 87.3 (H) 0 - 14 pg/mL   POCT INR    Collection Time: 02/16/23 11:27 AM   Result Value Ref Range    POC Protime 13.9 (H) 9.6 - 11.6 SECS    POC INR 1.2 0.9 - 1.2     Troponin    Collection Time: 02/16/23  2:03 PM   Result Value Ref Range    Troponin, High Sensitivity 88.2 (H) 0 - 14 pg/mL   POCT Glucose    Collection Time: 02/16/23  8:53 PM   Result Value Ref Range    POC Glucose 159 (H) 65 - 100 mg/dL    Performed by: AbercrombieKayliADN    CBC    Collection Time: 02/17/23  6:02 AM   Result Value Ref Range    WBC 4.3 4.3 - 11.1 K/uL    RBC 5.07 4.23 - 5.6 M/uL    Hemoglobin 14.4 13.6 - 17.2 g/dL    Hematocrit 42.2 41.1 - 50.3 %    MCV 83.2 82 - 102 FL    MCH 28.4 26.1 - 32.9 PG    MCHC 34.1 31.4 - 35.0 g/dL    RDW 12.6 11.9 - 14.6 %    Platelets 617 659 - 612 K/uL    MPV 11.2 9.4 - 12.3 FL    nRBC 0.00 0.0 - 0.2 K/uL   Hemoglobin A1c    Collection Time: 02/17/23  6:02 AM   Result Value Ref Range    Hemoglobin A1C 6.3 (H) 4.8 - 5.6 %    eAG 134 mg/dL   Lipid Panel    Collection Time: 02/17/23  6:02 AM   Result Value Ref Range    Cholesterol, Total 134 <200 MG/DL    Triglycerides 59 35 - 150 MG/DL    HDL 35 (L) 40 - 60 MG/DL    LDL Calculated 87.2 <100 MG/DL    VLDL Cholesterol Calculated 11.8 6.0 - 23.0 MG/DL    Chol/HDL Ratio 3.8     POCT Glucose    Collection Time: 02/17/23  6:16 AM   Result Value Ref Range    POC Glucose 100 65 - 100 mg/dL    Performed by: Brian WALKER (PCT) have personally reviewed imaging studies:  CT HEAD WO CONTRAST    Result Date: 2/16/2023  Exam: CT CODE NEURO HEAD WO CONTRAST on 2/16/2023 11:56 AM Clinical History: The Male patient is 59years old  presenting for General malaise with left-sided deficits. Technique: Thin slice axial CT images through the brain were obtained. All CT scans at this facility are performed using dose reduction/dose modulation techniques, as appropriate the performed exam, including the following: Automated Exposure Control;  Adjustment of the mA and/or kV according to patient size (this includes techniques or standardized protocols for targeted exams where dose is matched to indication/reason for exam); and Use of Iterative Reconstruction Technique. Radiation Exposure Indices: Reference Air Kerma (Ka,r) = 1225 mGy-cm Comparison:  None. Findings:  Cerebrum: Remote encephalomalacic changes throughout right medial temporal lobe and insula with focal encephalomalacia which extends into the corona radiata. Otherwise mild chronic periventricular white matter disease. No evidence of intracranial hemorrhage, mass, or other space-occupying lesion is seen. There are no abnormal extra-axial fluid collections. Cerebellum: Normal cerebellar morphology is demonstrated. CSF spaces: The ventricular system is within normal limits. The basilar cisterns are unremarkable. Brainstem: No evidence of ischemia, hemorrhage, or mass. Extracranial tissues: Visualized orbits and extracranial soft tissues are unremarkable. Paranasal sinuses/Mastoids: Well-pneumatized and aerated. . Calvarium: No acute osseous abnormality. 1.  Remote medial right temporal and insular ischemic changes otherwise chronic microvascular disease. CPT code 12664    CT BRAIN PERFUSION    Result Date: 2/16/2023  Exam: CT BRAIN PERFUSION on 2/16/2023 12:25 PM Clinical History: The Male patient is 59years old  presenting for Dysarthria. COMPARISON: Head CT 2/16/2023 TECHNIQUE: CT perfusion of the brain was obtained after the administration of intravenous contrast.  Perfusion maps and perfusion analysis output were generated using the VI[employer] ContaCt perfusion processing software algorithm for LVO detection. Radiation dose reduction techniques were used for this study: All CT scans performed at this facility use one or all of the following: Automated exposure control, adjustment of the mA and/or kVp according to patient's size, iterative reconstruction. Total radiation dose: 2139 mGy-cm FINDINGS: Study is technically adequate. Adequate vascular enhancement is demonstrated.  RAPID Output Values: CBF < 30% volume (best correlation with core infarct volume without overcalls): 0 ml (core infarction volume greater than 50 cc associated with poor outcomes) Tmax > 6 seconds: 28 ml Tmax/CBF Mismatch Volume: 28 ml Tmax/CBF Mismatch Ratio: None Hypoperfusion Intensity Ratio (Tmax > 10 seconds / Tmax > 6 seconds): 0 (values greater than 0.5 associated with poor outcome) Tmax > 10 seconds Volume: 0 ml (volume greater than 100 mL is associated with poor outcome)     1. No evidence of core CT cerebral perfusion defect. 2. Hypoperfusion changes involving the right cerebellum and occipital lobe. Please note that the determination of patient treatment is not based solely upon imaging factors or calculation values. Management of ischemia is at the discretion of the primary physician and is based upon a combination of clinical and imaging data, along other factors. CTA HEAD NECK W CONTRAST    Result Date: 2/16/2023  HISTORY: 59years of age Male with slurred speech and weakness. History of stroke. EXAM: CTA HEAD NECK W CONTRAST. Radiation reduction dose techniques were used for the study. Our CT scanner use one or all of the following- Automated exposure control, adjustment of the mA and/or KV according the patient size, iterative reconstruction. 3-D multiplanar reformations were performed at the workstation. All carotid artery stenosis percentages are based upon NASCET criteria if appropriate. Contrast: 100 ml of Isovue-370 intravenous contrast was administered. Per the CT technologist, the study was analyzed by the [address] algorithm. The radiologists have been asked by the hospital administration to designate when CTA studies are sent to [address] ai even though this algorithm is not used by the radiologist for exam interpretation. COMPARISON: No prior vascular imaging of the head or neck available. FINDINGS: VASCULAR FINDINGS: Cervical CTA: Visualized Aorta: There is a three-vessel branching pattern of the aortic arch.  No significant atherosclerotic disease of the aortic arch. Right Subclavian Artery: Grossly patent. Left Subclavian Artery: Grossly Patent. Right Carotid Arteries: Common Carotid Artery: Patent without significant stenosis. External Carotid Artery: Grossly patent. Carotid Bulb and Proximal ICA: No significant atherosclerotic disease or stenosis. Cervical Right ICA: Patent. Vertebral Artery Dominance: Codominant. Cervical Right Vertebral Artery: Patent. Left Carotid Arteries: Common Carotid Artery: Patent without significant stenosis. External Carotid Artery: Grossly patent. Carotid Bulb and Proximal ICA: No significant atherosclerotic disease or stenosis. Cervical Left ICA: Patent. Cervical Left Vertebral Artery: Patent. Cranial CTA: Right Internal Carotid Artery: Patent. Left Internal Carotid Artery: Patent. Intracranial ICA Atherosclerotic Disease: There is no significant atherosclerotic disease of the bilateral ICAs. Anterior Cerebral Arteries: Right A1 Segment: Patent. Left A1: Patent. Bilateral A2 and A3 segments appear grossly patent. Right Middle Cerebral Arteries: Right M1 segment is patent. Major proximal MCA branches beyond the bifurcation are patent. Left Middle Cerebral Arteries: Left M1 segment is patent. Major proximal MCA branches beyond the bifurcation are patent. Right Vertebral Artery: Patent. Left Vertebral Artery: Patent. Basilar Artery: Patent. Right Posterior Cerebral Artery: Patent. Left Posterior Cerebral Artery: Patent. Intracranial Aneurysms: None visualized. . Intracranial Proximal Vessel Occlusion: None. Major Dural Venous Sinuses: Grossly patent where adequately visualized. NONVASCULAR FINDINGS: Head: Brain Parenchyma: Findings of old right basal ganglia and insular infarct. No evidence of intracranial hemorrhage. Orbital Structures: Grossly unremarkable. Calvarial/Maxillofacial Soft Tissues: No evidence of significant acute abnormality in the calvarial or maxillofacial soft tissues.  Neck: Thyroid gland:Subcentimeter hypodense nodule in the right thyroid lobe. Cervical and Upper Mediastinal Adenopathy: No significant adenopathy. Upper Lungs: Without areas of prominent focal consolidation or masses. OSSEOUS STRUCTURES: Mastoid Air Cells: Unremarkable. Paranasal Sinuses: No evidence of significant mucoperiosteal thickening. Cervical Spine: Mild degenerative changes of the mid to lower cervical spine. 1. Negative CTA exam of the major cervical arterial vasculature for significant stenosis or occlusion. 2. Negative CTA exam of the major intracranial arterial vasculature for significant proximal vessel stenosis, occlusion, or aneurysms. 3. Findings of old right basal ganglia and right insular infarct. Echocardiogram:  No results found for this or any previous visit.         Orders Placed This Encounter   Medications    iopamidol (ISOVUE-370) 76 % injection 100 mL    OR Linked Order Group     ondansetron (ZOFRAN-ODT) disintegrating tablet 4 mg     ondansetron (ZOFRAN) injection 4 mg    polyethylene glycol (GLYCOLAX) packet 17 g    DISCONTD: enoxaparin (LOVENOX) injection 40 mg     Order Specific Question:   Indication of Use     Answer:   Prophylaxis-DVT/PE    DISCONTD: aspirin EC tablet 81 mg    DISCONTD: aspirin suppository 300 mg    atorvastatin (LIPITOR) tablet 80 mg    labetalol (NORMODYNE;TRANDATE) injection 10 mg    acetaminophen (TYLENOL) tablet 650 mg    DISCONTD: atorvastatin (LIPITOR) tablet 80 mg    bictegravir-emtricitab-tenofovir alafenamide (BIKTARVY) -25 MG per tablet 1 tablet    FLUoxetine (PROZAC) capsule 20 mg    HYDROcodone-acetaminophen (NORCO) 7.5-325 MG per tablet 1 tablet    QUEtiapine (SEROQUEL) tablet 25 mg    venlafaxine (EFFEXOR XR) extended release capsule 150 mg    DISCONTD: enoxaparin Sodium (LOVENOX) injection 30 mg     Order Specific Question:   Indication of Use     Answer:   Prophylaxis-DVT/PE    metoprolol tartrate (LOPRESSOR) tablet 50 mg    insulin lispro (HUMALOG) injection vial 0-4 Units insulin lispro (HUMALOG) injection vial 0-4 Units    glucose chewable tablet 16 g    OR Linked Order Group     dextrose bolus 10% 125 mL     dextrose bolus 10% 250 mL    glucagon (rDNA) injection 1 mg    dextrose 10 % infusion    potassium chloride (KLOR-CON M) extended release tablet 40 mEq    apixaban (ELIQUIS) tablet 5 mg     Order Specific Question:   Indication of Use     Answer:   MARCO ANTONIO Siu/MARCO ANTONIO Grier         Signed:  Chon Alcala MD

## 2023-02-17 NOTE — PROGRESS NOTES
ACUTE PHYSICAL THERAPY GOALS:   (Developed with and agreed upon by patient and/or caregiver. )  LTG:  (1.)Mr. Zhao will move from supine to sit and sit to supine , scoot up and down, and roll side to side in bed with INDEPENDENT within 7 treatment day(s). (2.)Mr. Zhao will transfer from bed to chair and chair to bed with MOD I using the least restrictive device within 7 treatment day(s). (3.)Mr. Zhao will perform there ex B LE strengthening with SUPERVISION within 7 treatment days for increased AROM and strength. (4.)Mr. Zhao will perform static and dynamic standing balance activities x 10 min with CGA within 7 treatment days for increased balance with transfers. ________________________________________________________________________________________________     PHYSICAL THERAPY Initial Assessment and AM  (Link to Caseload Tracking: PT Visit Days : 1  Acknowledge Orders  Time In/Out  PT Charge Capture  Rehab Caseload Tracker    Juliann Bennett is a 59 y.o. male   PRIMARY DIAGNOSIS: <principal problem not specified>  Dysarthria [R47.1]  Slurred speech [R47.81]  Chest pain, unspecified type [R07.9]       Reason for Referral: Generalized Muscle Weakness (M62.81)  Difficulty in walking, Not elsewhere classified (R26.2)  History of falling (Z91.81)  Hemiplegia and hemiparesis following cerebral infarction affecting left non-dominant side (C06.163)  Inpatient: [place] / [place] / Product Type: *No Product type* /     ASSESSMENT:     REHAB RECOMMENDATIONS:   Recommendation to date pending progress:  Setting:  Outpatient Therapy-aquatic therapy    Equipment:    None-has all DME for home     ASSESSMENT:  Mr. Madelin Duverney presents to hospital on 2/16/23 with slurred speech, facial droop, headache, admit for CVA workup. Pt with hx significant for R CVA, L deficits UE/LE in 2021.  Pt A & O x 3, reports lives alone, uses power chair at baseline, independent ADLs, transfers; has aide M-F 9-12 for household needs. Pt reports family does not assist in care. Pt noted to be wet on arrival with brief and pants; rolled to L and R with SBA, CGA for change and cleaning with OT. Pt CGA to sit to R EOB, SbA transfer to chair. Do have some safety concerns with transfer, pt tends to keep LE crossed at times, but has performed this way consistently at home. Pt most likely close to baseline for activity level, reports still noticing deficits with speech. Pt expressed desire for more therapy and possibly to ambulate again, feel pt would benefit from outpatient aquatic therapy to see if can mobilize and increased AROM.      [facility] [address] [id number] AM-PAC 6 Clicks Basic Mobility Inpatient Short Form  AM-PAC Basic Mobility - Inpatient   How much help is needed turning from your back to your side while in a flat bed without using bedrails?: A Little  How much help is needed moving from lying on your back to sitting on the side of a flat bed without using bedrails?: A Little  How much help is needed moving to and from a bed to a chair?: A Little  How much help is needed standing up from a chair using your arms?: A Little  How much help is needed walking in hospital room?: Total  How much help is needed climbing 3-5 steps with a railing?: Total  AM-PAC Inpatient Mobility Raw Score : 14  AM-PAC Inpatient T-Scale Score : 38.1  Mobility Inpatient CMS 0-100% Score: 61.29  Mobility Inpatient CMS G-Code Modifier : CL    SUBJECTIVE:   Mr. Areli Morin states, \"I would like to walk again one day\"     Social/Functional Lives With: Alone  Type of Home: Senior housing apartment (duplex)  Home Layout: One level  Home Access: Level entry  Bathroom Equipment: 3-in-1 commode  Home Equipment: Wheelchair-electric  ADL Assistance: Independent  Ambulation Assistance: Non-ambulatory (power chair)    OBJECTIVE:     PAIN: Karolina Blight / O2: Zigmund Michael / [place] / DRAINS:   Pre Treatment:    0/10      Post Treatment: 0/10 Vitals    WDL    Oxygen RA None    RESTRICTIONS/PRECAUTIONS:                    GROSS EVALUATION: Intact Impaired (Comments):   AROM []  Limited L    PROM [x]    Strength []  R LE grossly 5/5, limited L grossly 2/5-3/5   Balance [] Sitting - Static: Good  Sitting - Dynamic: Good, -  Standing - Static: Fair, +   Posture [] Forward Head  Rounded Shoulders   Sensation [x]  B LE   Coordination []   Limited UE/LE   Tone []  L UE/LE   Edema []    Activity Tolerance []  General fatigue    []      COGNITION/  PERCEPTION: Intact Impaired (Comments):   Orientation [x]     Vision [x]     Hearing [x]     Cognition  [x]  Slower to respond but intact     MOBILITY: I Mod I S SBA CGA Min Mod Max Total  NT x2 Comments:   Bed Mobility    Rolling [] [] [] [x] [x] [] [] [] [] [] [] Using bed rail   Supine to Sit [] [] [] [x] [x] [] [] [] [] [] [] Using bed rail   Scooting [x] [] [] [x] [] [] [] [] [] [] []    Sit to Supine [] [] [] [] [] [] [] [] [] [x] [] In chair   Transfers    Sit to Stand [] [] [] [x] [] [] [] [] [] [] []    Bed to Chair [] [] [] [x] [] [] [] [] [] [] []    Stand to Sit [] [] [] [x] [] [] [] [] [] [] []     [] [] [] [] [] [] [] [] [] [] []    I=Independent, Mod I=Modified Independent, S=Supervision, SBA=Standby Assistance, CGA=Contact Guard Assistance,   Min=Minimal Assistance, Mod=Moderate Assistance, Max=Maximal Assistance, Total=Total Assistance, NT=Not Tested    GAIT: I Mod I S SBA CGA Min Mod Max Total  NT x2 Comments:   Level of Assistance [] [] [] [] [] [] [] [] [] [x] []    Distance   feet    DME N/A    Gait Quality N/A    Weightbearing Status      Stairs      I=Independent, Mod I=Modified Independent, S=Supervision, SBA=Standby Assistance, CGA=Contact Guard Assistance,   Min=Minimal Assistance, Mod=Moderate Assistance, Max=Maximal Assistance, Total=Total Assistance, NT=Not Tested    PLAN:   FREQUENCY AND DURATION: 3 times/week for duration of hospital stay or until stated goals are met, whichever comes first.    THERAPY PROGNOSIS: Good    PROBLEM LIST:   (Skilled intervention is medically necessary to address:)  Decreased ADL/Functional Activities  Decreased Activity Tolerance  Decreased Balance  Decreased Gait Ability  Decreased Strength  Decreased Transfer Abilities INTERVENTIONS PLANNED:   (Benefits and precautions of physical therapy have been discussed with the patient.)  Therapeutic Activity  Therapeutic Exercise/HEP  Neuromuscular Re-education  Gait Training  Education       TREATMENT:   EVALUATION: LOW COMPLEXITY: (Untimed Charge)    TREATMENT:   Co-Treatment PT/OT necessary due to patient's decreased overall endurance/tolerance levels, as well as need for high level skilled assistance to complete functional transfers/mobility and functional tasks  Therapeutic Activity (23 Minutes): Therapeutic activity included Rolling, Supine to Sit, Scooting, Transfer Training, Sitting balance , and Standing balance to improve functional Activity tolerance, Balance, Coordination, and Mobility.     TREATMENT GRID:  N/A    AFTER TREATMENT PRECAUTIONS: Alarm Activated, Bed/Chair Locked, Call light within reach, Chair, Needs within reach, and RN notified    INTERDISCIPLINARY COLLABORATION:  RN/ PCT, PT/ PTA, and OT/ STATON    EDUCATION: Education Given To: Patient  Education Provided: Transfer Training  Education Method: Verbal  Barriers to Learning: None  Education Outcome: Verbalized understanding    TIME IN/OUT:  Time In: 3168  Time Out: [address]  Minutes: [address], PT

## 2023-02-17 NOTE — CARE COORDINATION
Chart reviewed by  for potential discharge needs or concerns. Pt admitted due to slurred speech with concern for a CVA/TIA. Therapy evals complete with the recommendation for outpatient PT (aquatic therapy), OT & SLP. CM met with pt to discuss these recommendations. Demographics, insurance and PCP confirmed. Pt's street address # was incorrect- CM updated this in his EMR. Pt is agreeable with outpatient services and confirmed he would have transportation. Referral faxed to [address] [facility]. No other dc needs or concerns identified or reported. Please notify/consult  if other discharge needs arise. 02/17/23 0065   Service Assessment   Patient Orientation Alert and Oriented   Cognition Alert   History Provided By Patient;Medical Record   Primary Caregiver Other (Comment)  ([facility] caregiver-[employer])   Support Systems Family Members;Home Care Staff   PCP Verified by CM Yes  (Dr. Denys Keyes at [facility])   Last Visit to PCP Within last 6 months   Prior Functional Level Assistance with the following:;Mobility; Bathing;Cooking;Housework; Shopping   Current Functional Level Assistance with the following:;Mobility; Bathing;Cooking;Housework; Shopping   Can patient return to prior living arrangement Yes   Ability to make needs known: Good   Family able to assist with home care needs: Yes  (TC aide)   Would you like for me to discuss the discharge plan with any other family members/significant others, and if so, who?  No   Financial Resources  Resources Other (Comment)  (TC)   Social/Functional History   Lives With Alone   Type of Home Senior housing apartment  (duplex)   Home Layout One level   Home Access Level entry   [facility] care attendant  (TC aide)   [address] Needs assistance   Ambulation Assistance Non-ambulatory  (power chair)   Transfer Assistance Needs assistance   Active  No   Patient's 64 65 [phone] aide or ATUL Clements   Occupation On disability   Discharge Planning   Type of Residence Apartment   Living Arrangements Alone   Current Services Prior To Admission [facility] [employer] [facility]  (CLTC aide)   Potential Assistance Needed Outpatient PT/OT   DME Ordered? No   Potential Assistance Purchasing Medications No   Type of [address]   Patient expects to be discharged to: Apartment   One/Two Story Residence One story   Services At/After Discharge   Transition of Care Consult (CM Consult) N/A  (no CM consult received)   Services At/After Discharge Outpatient;OT;PT;SLP  (referral for aquatic PT, OT & SLP)   [employer] Provided? No   Mode of Transport at Discharge Other (see comment)  (aide)   Confirm Follow Up Transport Other (see comment)  (CLTC aide)   Condition of Participation: Discharge Planning   The Plan for Transition of Care is related to the following treatment goals: Outpatient PT/OT/SLP services to improve pt's strength, mobility and functional abilities   The Patient and/or Patient Representative was provided with a Choice of Provider? Patient   The Patient and/Or Patient Representative agree with the Discharge Plan? Yes   Freedom of Choice list was provided with basic dialogue that supports the patient's individualized plan of care/goals, treatment preferences, and shares the quality data associated with the providers?   Yes

## 2023-02-17 NOTE — ED NOTES
TRANSFER - OUT REPORT:    Verbal report given to Emily RN on [employer]  being transferred to [place] [id number] for routine progression of patient care       Report consisted of patient's Situation, Background, Assessment and   Recommendations(SBAR). Information from the following report(s) Nurse Handoff Report, ED Encounter Summary, ED SBAR, MAR, and Neuro Assessment was reviewed with the receiving nurse. Lines:   Peripheral IV 02/16/23 Right Antecubital (Active)   Site Assessment Clean, dry & intact 02/16/23 1128   Line Status Blood return noted; Flushed 02/16/23 1128   Line Care Connections checked and tightened 02/16/23 1128   Phlebitis Assessment No symptoms 02/16/23 1128   Infiltration Assessment 0 02/16/23 1128   Dressing Status New dressing applied;Clean, dry & intact 02/16/23 1128   Dressing Type Transparent 02/16/23 1128   Dressing Intervention New 02/16/23 1128        Opportunity for questions and clarification was provided.       Patient transported with:  Registered Nurse      Eugenio Nunez RN  02/16/23 3638

## 2023-02-17 NOTE — PROGRESS NOTES
Physician Progress Note      PATIENT:               Rudy Villa  CSN #:                  [id number]  :                       1965  ADMIT DATE:       2023 11:10 AM  DISCH DATE:  RESPONDING  PROVIDER #:        Corrie Tran MD          QUERY TEXT:    Patient admitted with CVA, noted to have chronic atrial fibrillation and is   maintained on Eliquis If possible, please document in progress notes and   discharge summary if you are evaluating and/or treating any of the following: The medical record reflects the following:  Risk Factors: 59 YOM, HTN, HIV, HLD, Chronic AF,  prior CVA with L hemiplegia,   anxiety, depression  Clinical Indicators:  EKG:  Atrial fibrillation  Multiple premature complexes, vent & supraven  Left atrial enlargement  Left anterior fascicular block  Anterior infarct, old  Minimal ST depression, inferior leads  HR 85  Treatment: Monitoring, Lopressor, Eliquis    Thank you,  Jere Moffett RN,C BSN  Clinical   [email or web address]  Options provided:  -- Secondary hypercoagulable state in a patient with atrial fibrillation  -- Other - I will add my own diagnosis  -- Disagree - Not applicable / Not valid  -- Disagree - Clinically unable to determine / Unknown  -- Refer to Clinical Documentation Reviewer    PROVIDER RESPONSE TEXT:    This patient has secondary hypercoagulable state in a patient with atrial   fibrillation. Query created by:  Alva Santana on 2023 9:49 AM      Electronically signed by:  Corrie Tran MD 2023 9:53 AM

## 2023-02-17 NOTE — PROGRESS NOTES
ACUTE OCCUPATIONAL THERAPY GOALS:   (Developed with and agreed upon by patient and/or caregiver. )2  1. Patient will complete lower body bathing and dressing with Mod I and adaptive equipment as   needed. 2. Patient will completed upper body bathing and dressing with Mod I and adaptive equipment as needed. 3. Patient will complete grooming seated at sink with Mod I and adaptive equipment as needed. 4. Patient will complete toileting with Mod I and adaptive equipment as needed. 5. Patient will tolerate 30 minutes of OT treatment with no rest breaks to increase activity tolerance for ADLs. 6. Patient will complete functional transfers with Mod I and adaptive equipment as needed. Timeframe: 7 visits       OCCUPATIONAL THERAPY Initial Assessment, Daily Note, and AM       OT Visit Days: 1  Acknowledge Orders  Time  OT Charge Capture  Rehab Caseload Tracker      Rashid Sutton is a 59 y.o. male   PRIMARY DIAGNOSIS: <principal problem not specified>  Dysarthria [R47.1]  Slurred speech [R47.81]  Chest pain, unspecified type [R07.9]       Reason for Referral: Generalized Muscle Weakness (M62.81)  Other abnormalities of gait and mobility (R26.89)  History of falling (Z91.81)  Inpatient: [place] / [place] / Product Type: *No Product type* /     ASSESSMENT:     REHAB RECOMMENDATIONS:   Recommendation to date pending progress:  Setting:  Outpatient Therapy    Equipment:    To Be Determined     ASSESSMENT:  Mr. Alecia Voss is a 58 y/o M presenting with slurred speech. Pt supine on entry on RA, A/O x 4. Applicable PMHx: R CVA with residual L hemiplegia, HTN, HIV, anxiety/depression. Pt denied light headedness, dizziness or SOB. Pt reports 1 fall(s) in past 6 months. PLOF Mod I for ADLs with aide present M-F [id number] for IADLs and transportation needs. Pt reports basin bathing baseline. DME present in home; power w/c, BSC.  Pt currently Min A with UB ADLs, Min A with LB ADLs, Min A for toileting and CGA for bed mobility and stand pivot t/f EOB->recliner. Rest breaks utilized as needed throughout session. Pt presents with deficits in ADLs, functional t/fs, household mobility for ADLs and activity tolerance compared to reported PLOF. Pt tolerated session well. Pt presents pleasant and motivated with strong rehab potential. Pt would benefit from continued skilled OT services for duration of hospital stay and after t/f to next level of care or until all stated goals met.       [address] [facility] [address] [address] AM-PAC 6 Clicks Daily Activity Inpatient Short Form:    AM-PAC Daily Activity - Inpatient   How much help is needed for putting on and taking off regular lower body clothing?: A Little  How much help is needed for bathing (which includes washing, rinsing, drying)?: A Little  How much help is needed for toileting (which includes using toilet, bedpan, or urinal)?: A Little  How much help is needed for putting on and taking off regular upper body clothing?: A Little  How much help is needed for taking care of personal grooming?: A Little  How much help for eating meals?: None  AM-[facility] Inpatient Daily Activity Raw Score: 19  AM-PAC Inpatient ADL T-Scale Score : 40.22  ADL Inpatient CMS 0-100% Score: 42.8  ADL Inpatient CMS G-Code Modifier : CK           SUBJECTIVE:     Mr. Beltran Feliciano states, \"I want to go home [pt prefers home over STR]\"     Social/Functional Lives With: Alone  Type of Home: Senior housing apartment (duplex)  Home Layout: One level  Home Access: Level entry  [place] Equipment: 3-in-1 commode  Home Equipment: Wheelchair-electric  ADL Assistance: Independent  Ambulation Assistance: Non-ambulatory (power chair)    OBJECTIVE:     Elaine Christianson / Viri Park / AIRWAY: Telemetry     RESTRICTIONS/PRECAUTIONS:       PAIN: VITALS / O2:   Pre Treatment:   Pain Assessment: None - Denies Pain      Post Treatment: None       Vitals          Oxygen            GROSS EVALUATION: INTACT IMPAIRED   (See Comments)   UE AROM [] []RUE WFL, LUE flaccid with intermittent tone noted   UE PROM [] []LUE limited d/t pain at shoulder per pt, PROM hands and WFL    Strength [] LUE Strength  Gross LUE Strength: Exceptions to [facility]  LUE Strength Comment: Flaccid s/p R CVA 2021  RUE Strength  Gross RUE Strength: WFL  RUE Strength Comment: 3/5     Posture / Balance [] Sitting - Static: Good  Sitting - Dynamic: Good, -  Standing - Static: Fair, +   Sensation []  WFL at baseline   Coordination []  Generally decreased     Tone []  Intermittent tone in LUE     Edema [] N/A   Activity Tolerance [] Patient limited by fatigue     Hand Dominance R [] L []      COGNITION/  PERCEPTION: INTACT IMPAIRED   (See Comments)   Orientation []     Vision []     Hearing []     Cognition  []     Perception []       MOBILITY: I Mod I S SBA CGA Min Mod Max Total  NT x2 Comments:   Bed Mobility    Rolling [] [] [] [] [x] [] [] [] [] [] []    Supine to Sit [] [] [] [] [x] [] [] [] [] [] []    Scooting [] [] [] [] [x] [] [] [] [] [] []    Sit to Supine [] [] [] [] [x] [] [] [] [] [] []    Transfers    Sit to Stand [] [] [] [] [x] [] [] [] [] [] []    Bed to Chair [] [] [] [] [x] [] [] [] [] [] [] SPT   Stand to Sit [] [] [] [] [x] [] [] [] [] [] []    Tub/Shower [] [] [] [] [] [] [] [] [] [x] []     Toilet [] [] [] [] [] [] [] [] [] [x] []      [] [] [] [] [] [] [] [] [] [] []    I=Independent, Mod I=Modified Independent, S=Supervision/Setup, SBA=Standby Assistance, CGA=Contact Guard Assistance, Min=Minimal Assistance, Mod=Moderate Assistance, Max=Maximal Assistance, Total=Total Assistance, NT=Not Tested    ACTIVITIES OF DAILY LIVING: I Mod I S SBA CGA Min Mod Max Total NT Comments   BASIC ADLs:              Upper Body Bathing  [] [] [] [] [] [x] [] [] [] [] UB bathing supine   Lower Body Bathing [] [] [] [] [] [x] [] [] [] [] LB bathing supine   Toileting [] [] [] [] [] [x] [] [] [] [] Toileting hygiene supine   Upper Body Dressing [] [] [] [] [] [x] [] [] [] [] Doff/don gown seated EOB   Lower Body Dressing [] [] [] [] [] [x] [] [] [] [] Doff/don socks seated EOB   Feeding [] [] [x] [] [] [] [] [] [] []    Grooming [] [] [] [] [] [x] [] [] [] [] Face washing supine   Personal Device Care [] [] [] [] [] [] [] [] [] [x]    Functional Mobility [] [] [] [] [x] [] [] [] [] [] Bed mobility and SPT EOB->recliner   I=Independent, Mod I=Modified Independent, S=Supervision/Setup, SBA=Standby Assistance, CGA=Contact Guard Assistance, Min=Minimal Assistance, Mod=Moderate Assistance, Max=Maximal Assistance, Total=Total Assistance, NT=Not Tested    PLAN:   FREQUENCY/DURATION   OT Plan of Care: 3 times/week for duration of hospital stay or until stated goals are met, whichever comes first.    PROBLEM LIST:   (Skilled intervention is medically necessary to address:)  Decreased ADL/Functional Activities  Decreased Activity Tolerance  Decreased AROM/PROM  Decreased Balance  Decreased Coordination  Decreased Gait Ability  Decreased Strength  Decreased Transfer Abilities   INTERVENTIONS PLANNED:  (Benefits and precautions of occupational therapy have been discussed with the patient.)  Self Care Training  Therapeutic Activity  Therapeutic Exercise/HEP  Neuromuscular Re-education  Manual Therapy  Education         TREATMENT:     EVALUATION: LOW COMPLEXITY: (Untimed Charge)    TREATMENT:   Co-Treatment PT/OT necessary due to patient's decreased overall endurance/tolerance levels, as well as need for high level skilled assistance to complete functional transfers/mobility and functional tasks  Neuromuscular Re-education (8 Minutes): Patient participated in neuromuscular re-education including functional reaching, weight shifting, postural training, standing tolerance activity , and sitting balance activity   with minimal verbal cues to improve sitting balance, standing balance, posture, coordination, static balance, and dynamic balance in order to prepare for functional task, prepare for seated ADLs, and prepare for functional transfer. Self Care (15 minutes): Patient participated in upper body bathing, lower body bathing, toileting, upper body dressing, lower body dressing, and grooming ADLs in unsupported sitting and supine with minimal verbal cueing to increase independence, decrease assistance required, and increase activity tolerance. Patient also participated in functional mobility, functional transfer, and energy conservation training to increase independence, decrease assistance required, and increase activity tolerance.      TREATMENT GRID:  N/A    AFTER TREATMENT PRECAUTIONS: Alarm Activated, Bed/Chair Locked, Call light within reach, Chair, Needs within reach, and RN notified    INTERDISCIPLINARY COLLABORATION:  RN/ PCT, PT/ PTA, and OT/ STATON    EDUCATION:  Education Given To: Patient  Education Provided: Role of Therapy;Plan of Care;Energy Conservation  Education Method: Verbal  Barriers to Learning: None  Education Outcome: Verbalized understanding;Continued education needed    TOTAL TREATMENT DURATION AND TIME:  Time In: 1048  Time Out: [address]  Minutes: [address], OT

## 2023-02-17 NOTE — PROGRESS NOTES
Neurology Daily Progress Note     Assessment:     17-year-old man with a history of stroke. The patient presented with severe dysarthria and has an acute cerebellar stroke. This occurred while the patient was off Eliquis    Plan:     Eliquis will be resumed on Sunday morning. Risk of hemorrhagic transformation at that point will be low    Continue to work with therapies      Subjective: Interval history:    17-year-old man presenting with dysarthria. Today, he is doing about the same. MRI shows a cerebellar stroke    History:    Wilian Foreman is a 59 y.o. male who is being seen for stroke. Past Medical History:   Diagnosis Date    Stroke [facility])      No past surgical history on file. No family history on file.   Social History     Tobacco Use    Smoking status: Never    Smokeless tobacco: Never   Substance Use Topics    Alcohol use: Not Currently      Current Facility-Administered Medications   Medication Dose Route Frequency Provider Last Rate Last Admin    potassium chloride (KLOR-CON M) extended release tablet 40 mEq  40 mEq Oral Daily with breakfast Cornelia Doran MD   40 mEq at 02/17/23 1010    apixaban (ELIQUIS) tablet 5 mg  5 mg Oral BID Cornelia Doran MD   5 mg at 02/17/23 1036    ondansetron (ZOFRAN-ODT) disintegrating tablet 4 mg  4 mg Oral Q8H PRN Vitaliy Rodriguez MD        Or    ondansetron (ZOFRAN) injection 4 mg  4 mg IntraVENous Q6H PRN Vitaliy Rodriguez MD        polyethylene glycol (GLYCOLAX) packet 17 g  17 g Oral Daily PRN Vitaliy Rodriguez MD        atorvastatin (LIPITOR) tablet 80 mg  80 mg Oral Nightly Vitaliy Rodriguez MD   80 mg at 02/16/23 2050    labetalol (NORMODYNE;TRANDATE) injection 10 mg  10 mg IntraVENous Q10 Min PRN Vitaliy Rodriguez MD        acetaminophen (TYLENOL) tablet 650 mg  650 mg Oral Q4H PRN Vitaliy Rodriguez MD        bictegravir-emtricitab-tenofovir alafenamide (BIKTARVY) -25 MG per tablet 1 tablet  1 tablet Oral Daily Vitaliy Rodriguez MD FLUoxetine (PROZAC) capsule 20 mg  20 mg Oral Daily Nancy Saldana MD   20 mg at 02/17/23 1002    HYDROcodone-acetaminophen (NORCO) 7.5-325 MG per tablet 1 tablet  1 tablet Oral Q8H PRN Nancy Saldana MD        QUEtiapine (SEROQUEL) tablet 25 mg  25 mg Oral Nightly Nancy Saldana MD   25 mg at 02/16/23 2051    venlafaxine (EFFEXOR XR) extended release capsule 150 mg  150 mg Oral BID Nancy Saldana MD   150 mg at 02/17/23 1001    metoprolol tartrate (LOPRESSOR) tablet 50 mg  50 mg Oral BID Nancy Saldana MD   50 mg at 02/17/23 1001    insulin lispro (HUMALOG) injection vial 0-4 Units  0-4 Units SubCUTAneous TID WC Nancy Saldana MD        insulin lispro (HUMALOG) injection vial 0-4 Units  0-4 Units SubCUTAneous Nightly Nancy Saldana MD        glucose chewable tablet 16 g  4 tablet Oral PRN Nancy Saldana MD        dextrose bolus 10% 125 mL  125 mL IntraVENous PRN Nancy Saldana MD        Or    dextrose bolus 10% 250 mL  250 mL IntraVENous PRN Nancy Saldana MD        glucagon (rDNA) injection 1 mg  1 mg SubCUTAneous PRN Nancy Saldana MD        dextrose 10 % infusion   IntraVENous Continuous PRN Nancy Saldana MD            Allergies   Allergen Reactions    Amlodipine Shortness Of Breath       Review of systems negative with exception of pertinent positives and negatives noted above.        Objective:     Vitals:    02/17/23 0000 02/17/23 0400 02/17/23 0800 02/17/23 1001   BP: (!) 150/103 (!) 163/113 (!) 160/122 (!) 160/122   Pulse: 65 79 76    Resp: 17 17 17    Temp: 98.1 °F (36.7 °C) 97.9 °F (36.6 °C) 97.9 °F (36.6 °C)    TempSrc: Oral Oral Oral    SpO2: 100% 98% 94%    Weight:       Height:              Current Facility-Administered Medications:     potassium chloride (KLOR-CON M) extended release tablet 40 mEq, 40 mEq, Oral, Daily with breakfast, Corrie Tran MD, 40 mEq at 02/17/23 1010    apixaban (ELIQUIS) tablet 5 mg, 5 mg, Oral, BID, Corrie Tran MD, 5 mg at 02/17/23 5844 ondansetron (ZOFRAN-ODT) disintegrating tablet 4 mg, 4 mg, Oral, Q8H PRN **OR** ondansetron (ZOFRAN) injection 4 mg, 4 mg, IntraVENous, Q6H PRN, Chito Cormier MD    polyethylene glycol (GLYCOLAX) packet 17 g, 17 g, Oral, Daily PRN, Chito Cormier MD    atorvastatin (LIPITOR) tablet 80 mg, 80 mg, Oral, Nightly, Chito Cormier MD, 80 mg at 02/16/23 2050    labetalol (NORMODYNE;TRANDATE) injection 10 mg, 10 mg, IntraVENous, Q10 Min PRN, Chito Cormier MD    acetaminophen (TYLENOL) tablet 650 mg, 650 mg, Oral, Q4H PRN, Chito Cormier MD    bictegravir-emtricitab-tenofovir alafenamide (BIKTARVY) -25 MG per tablet 1 tablet, 1 tablet, Oral, Daily, Chito Cormier MD    FLUoxetine (PROZAC) capsule 20 mg, 20 mg, Oral, Daily, Chito Cormier MD, 20 mg at 02/17/23 1002    HYDROcodone-acetaminophen (NORCO) 7.5-325 MG per tablet 1 tablet, 1 tablet, Oral, Q8H PRN, Chito Cormier MD    QUEtiapine (SEROQUEL) tablet 25 mg, 25 mg, Oral, Nightly, Chito Cormier MD, 25 mg at 02/16/23 2051    venlafaxine (EFFEXOR XR) extended release capsule 150 mg, 150 mg, Oral, BID, Chito Cormier MD, 150 mg at 02/17/23 1001    metoprolol tartrate (LOPRESSOR) tablet 50 mg, 50 mg, Oral, BID, Chito Cormier MD, 50 mg at 02/17/23 1001    insulin lispro (HUMALOG) injection vial 0-4 Units, 0-4 Units, SubCUTAneous, TID WC, Chito Cormier MD    insulin lispro (HUMALOG) injection vial 0-4 Units, 0-4 Units, SubCUTAneous, Nightly, Chito Cormier MD    glucose chewable tablet 16 g, 4 tablet, Oral, PRN, Chito Cormier MD    dextrose bolus 10% 125 mL, 125 mL, IntraVENous, PRN **OR** dextrose bolus 10% 250 mL, 250 mL, IntraVENous, PRN, Chito Cormier MD    glucagon (rDNA) injection 1 mg, 1 mg, SubCUTAneous, PRN, Chito Cormier MD    dextrose 10 % infusion, , IntraVENous, Continuous PRN, Chito Cormier MD    Recent Results (from the past 12 hour(s))   CBC    Collection Time: 02/17/23  6:02 AM   Result Value Ref Range    WBC 4.3 4.3 - 11.1 K/uL    RBC 5.07 4.23 - 5.6 M/uL    Hemoglobin 14.4 13.6 - 17.2 g/dL    Hematocrit 42.2 41.1 - 50.3 %    MCV 83.2 82 - 102 FL    MCH 28.4 26.1 - 32.9 PG    MCHC 34.1 31.4 - 35.0 g/dL    RDW 12.6 11.9 - 14.6 %    Platelets 251 151 - 313 K/uL    MPV 11.2 9.4 - 12.3 FL    nRBC 0.00 0.0 - 0.2 K/uL   Hemoglobin A1c    Collection Time: 02/17/23  6:02 AM   Result Value Ref Range    Hemoglobin A1C 6.3 (H) 4.8 - 5.6 %    eAG 134 mg/dL   Lipid Panel    Collection Time: 02/17/23  6:02 AM   Result Value Ref Range    Cholesterol, Total 134 <200 MG/DL    Triglycerides 59 35 - 150 MG/DL    HDL 35 (L) 40 - 60 MG/DL    LDL Calculated 87.2 <100 MG/DL    VLDL Cholesterol Calculated 11.8 6.0 - 23.0 MG/DL    Chol/HDL Ratio 3.8     POCT Glucose    Collection Time: 02/17/23  6:16 AM   Result Value Ref Range    POC Glucose 100 65 - 100 mg/dL    Performed by: Torres (PCT)          Most recent MRI   Results for orders placed during the hospital encounter of 02/16/23    MRI BRAIN WO CONTRAST    Narrative  MRI brain without contrast:    History: Acute onset dysarthria    Imaging sequences: Multiplanar multisequence imaging was performed on a 1.5  Skylar magnet. Comparison: None. Correlation is made to the CT scan of the brain and CT  perfusion study performed earlier on the same day. Findings: There are remote infarctions within the right basal ganglia/corona  radiata. There is associated hemosiderin staining There is mild ex vacuo  dilatation of the body right lateral ventricle. The ventricles are otherwise  normal in size and configuration. There is asymmetric prominence to the extra-axial space within the right middle  cranial fossa suspicious for an arachnoid cyst measuring up to 1.9 cm. There are  no additional extra-axial fluid collections. Normal flow voids are present  within all of the major intracranial vessels. No evidence of acute  intraparenchymal hemorrhage or mass effect is identified.     There is an acute infarction within the right cerebellar hemisphere within the  superior cerebellar artery distribution. Patchy and discrete foci of T2 prolongation are present within the  supratentorial white matter. These are nonspecific findings but would be most  compatible with moderate chronic small vessel ischemic changes. There are a few opacified right mastoid air cells. There is a lipoma within the  midline occipital soft tissues measuring up to 2.9 cm. Impression  1. Acute right cerebellar infarction. 2. Moderate chronic small vessel ischemic change. 3. Remote right basal ganglia/corona radiata lacunar infarction. Physical Exam:  General - Well developed, well nourished, in no apparent distress. Pleasant and conversant. HEENT - Normocephalic, atraumatic. Conjunctiva are clear. Neck - Supple without masses  Extremities - Peripheral pulses intact. No edema and no rashes. Neurological examination -     Neurological examination is unchanged from yesterday. He has hemiparesis on the left side of the body which is chronic. He has cerebellar dysarthria/scanning speech. Signed By: Lc Chow DO     February 17, 2023      I spent 35 minutes today with the patient, which included chart review, documentation, and greater than 50% of time was spent in direct face-to-face contact, obtaining history, exam, coordinating care, and counseling the patient on medical condition.

## 2023-02-18 LAB
GLUCOSE BLD STRIP.AUTO-MCNC: 101 MG/DL (ref 65–100)
GLUCOSE BLD STRIP.AUTO-MCNC: 101 MG/DL (ref 65–100)
GLUCOSE BLD STRIP.AUTO-MCNC: 103 MG/DL (ref 65–100)
GLUCOSE BLD STRIP.AUTO-MCNC: 136 MG/DL (ref 65–100)
SERVICE CMNT-IMP: ABNORMAL

## 2023-02-18 PROCEDURE — 6370000000 HC RX 637 (ALT 250 FOR IP): Performed by: FAMILY MEDICINE

## 2023-02-18 PROCEDURE — 82962 GLUCOSE BLOOD TEST: CPT

## 2023-02-18 PROCEDURE — 99231 SBSQ HOSP IP/OBS SF/LOW 25: CPT | Performed by: PSYCHIATRY & NEUROLOGY

## 2023-02-18 PROCEDURE — 1100000000 HC RM PRIVATE

## 2023-02-18 PROCEDURE — 6370000000 HC RX 637 (ALT 250 FOR IP): Performed by: HOSPITALIST

## 2023-02-18 RX ORDER — LISINOPRIL 5 MG/1
10 TABLET ORAL DAILY
Status: DISCONTINUED | OUTPATIENT
Start: 2023-02-18 | End: 2023-02-19

## 2023-02-18 RX ADMIN — METOPROLOL TARTRATE 50 MG: 50 TABLET, FILM COATED ORAL at 08:25

## 2023-02-18 RX ADMIN — ASPIRIN 81 MG: 81 TABLET ORAL at 08:25

## 2023-02-18 RX ADMIN — BICTEGRAVIR SODIUM, EMTRICITABINE, AND TENOFOVIR ALAFENAMIDE FUMARATE 1 TABLET: 50; 200; 25 TABLET ORAL at 09:23

## 2023-02-18 RX ADMIN — ATORVASTATIN CALCIUM 80 MG: 80 TABLET, FILM COATED ORAL at 20:19

## 2023-02-18 RX ADMIN — QUETIAPINE FUMARATE 25 MG: 25 TABLET ORAL at 20:19

## 2023-02-18 RX ADMIN — POTASSIUM CHLORIDE 40 MEQ: 1500 TABLET, EXTENDED RELEASE ORAL at 08:25

## 2023-02-18 RX ADMIN — VENLAFAXINE HYDROCHLORIDE 150 MG: 37.5 CAPSULE, EXTENDED RELEASE ORAL at 22:44

## 2023-02-18 RX ADMIN — LISINOPRIL 10 MG: 5 TABLET ORAL at 11:32

## 2023-02-18 RX ADMIN — VENLAFAXINE HYDROCHLORIDE 150 MG: 37.5 CAPSULE, EXTENDED RELEASE ORAL at 08:24

## 2023-02-18 RX ADMIN — METOPROLOL TARTRATE 50 MG: 50 TABLET, FILM COATED ORAL at 20:19

## 2023-02-18 RX ADMIN — FLUOXETINE 20 MG: 10 CAPSULE ORAL at 08:25

## 2023-02-18 ASSESSMENT — PAIN SCALES - GENERAL
PAINLEVEL_OUTOF10: 0
PAINLEVEL_OUTOF10: 0

## 2023-02-18 NOTE — PLAN OF CARE
Problem: Discharge Planning  Goal: Discharge to home or other facility with appropriate resources  Outcome: Progressing  Flowsheets (Taken 2/17/2023 1910)  Discharge to home or other facility with appropriate resources: Identify barriers to discharge with patient and caregiver     Problem: Pain  Goal: Verbalizes/displays adequate comfort level or baseline comfort level  Outcome: Progressing  Flowsheets (Taken 2/17/2023 2014)  Verbalizes/displays adequate comfort level or baseline comfort level: Encourage patient to monitor pain and request assistance     Problem: Skin/Tissue Integrity  Goal: Absence of new skin breakdown  Description: 1. Monitor for areas of redness and/or skin breakdown  2. Assess vascular access sites hourly  3. Every 4-6 hours minimum:  Change oxygen saturation probe site  4. Every 4-6 hours:  If on nasal continuous positive airway pressure, respiratory therapy assess nares and determine need for appliance change or resting period.   Outcome: Progressing     Problem: Safety - Adult  Goal: Free from fall injury  Outcome: Progressing  Flowsheets (Taken 2/17/2023 1910)  Free From Fall Injury: Instruct family/caregiver on patient safety     Problem: ABCDS Injury Assessment  Goal: Absence of physical injury  Outcome: Progressing  Flowsheets (Taken 2/17/2023 1910)  Absence of Physical Injury: Implement safety measures based on patient assessment     Problem: Chronic Conditions and Co-morbidities  Goal: Patient's chronic conditions and co-morbidity symptoms are monitored and maintained or improved  Outcome: Progressing  Flowsheets (Taken 2/17/2023 1910)  Care Plan - Patient's Chronic Conditions and Co-Morbidity Symptoms are Monitored and Maintained or Improved: Monitor and assess patient's chronic conditions and comorbid symptoms for stability, deterioration, or improvement     Problem: Neurosensory - Adult  Goal: Achieves stable or improved neurological status  Outcome: Progressing  Flowsheets (Taken 2/17/2023 1910)  Achieves stable or improved neurological status: Assess for and report changes in neurological status  Goal: Achieves maximal functionality and self care  Outcome: Progressing  Flowsheets (Taken 2/17/2023 1910)  Achieves maximal functionality and self care: Encourage and assist patient to increase activity and self care with guidance from physical therapy/occupational therapy     Problem: Cardiovascular - Adult  Goal: Maintains optimal cardiac output and hemodynamic stability  Outcome: Progressing  Flowsheets (Taken 2/17/2023 1910)  Maintains optimal cardiac output and hemodynamic stability: Monitor blood pressure and heart rate  Goal: Absence of cardiac dysrhythmias or at baseline  Outcome: Progressing  Flowsheets (Taken 2/17/2023 1910)  Absence of cardiac dysrhythmias or at baseline: Monitor cardiac rate and rhythm     Problem: Skin/Tissue Integrity - Adult  Goal: Skin integrity remains intact  Outcome: Progressing  Flowsheets (Taken 2/17/2023 1910)  Skin Integrity Remains Intact: Monitor for areas of redness and/or skin breakdown  Goal: Incisions, wounds, or drain sites healing without S/S of infection  Outcome: Progressing  Flowsheets (Taken 2/17/2023 1910)  Incisions, Wounds, or Drain Sites Healing Without Sign and Symptoms of Infection: ADMISSION and DAILY: Assess and document risk factors for pressure ulcer development     Problem: Musculoskeletal - Adult  Goal: Return mobility to safest level of function  Outcome: Progressing  Flowsheets (Taken 2/17/2023 1910)  Return Mobility to Safest Level of Function: Assess patient stability and activity tolerance for standing, transferring and ambulating with or without assistive devices  Goal: Maintain proper alignment of affected body part  Outcome: Progressing  Flowsheets (Taken 2/17/2023 1910)  Maintain proper alignment of affected body part: Support and protect limb and body alignment per provider's orders  Goal: Return ADL status to a safe level of function  Outcome: Progressing  Flowsheets (Taken 2/17/2023 1910)  Return ADL Status to a Safe Level of Function: Administer medication as ordered     Problem: Metabolic/Fluid and Electrolytes - Adult  Goal: Hemodynamic stability and optimal renal function maintained  Outcome: Progressing  Flowsheets (Taken 2/17/2023 1910)  Hemodynamic stability and optimal renal function maintained: Monitor labs and assess for signs and symptoms of volume excess or deficit  Goal: Glucose maintained within prescribed range  Outcome: Progressing  Flowsheets (Taken 2/17/2023 1910)  Glucose maintained within prescribed range: Monitor blood glucose as ordered

## 2023-02-18 NOTE — PROGRESS NOTES
Hospitalist   Admit Date:  2023 11:10 AM   Name:  Maria Del Rosario Zhao   Age:  59 y.o. Sex:  male  :  1965   MRN:  [id number]   Room:        History of Present Illness:     59 y.o. male with medical history of HTN, HIVhyperlipidemia, chronic A-fib, history of CVA with left hemiplegia, anxiety/depression. Presented to ER with a headache- mild, dull and speech problems- mild to moderate  Code S was called. CT head, CTA head and neck no acute findings. Not a candidate for thrombectomy. Ekg -Atrial flutter  Admitted for slurred speech/headache- prob cva    Today, speech abnormality has not improved but take PO, no change    Assessment & Plan:     1- Acute right ischemic cerebellar infarct, with slurred speech, has hx of stroke in the past with residual left weakness (arm>leg). Hx of afib and ran out of Eliquis for 1 week pre-admission. 2- HTN, fairly controlled  As per Neurology no need for permissive blood pressure  3- DM type 2, controlled    Rx:  Eliquis tomorrow  Neuro monitoring  BP control- meds advanced  Insulin scale  ASA  Statin     Dispo; home when ok w/ neurology    Objective:   Patient Vitals for the past 24 hrs:   Temp Pulse Resp BP SpO2   23 1119 97.7 °F (36.5 °C) 67 18 (!) 144/101 95 %   23 0812 98.6 °F (37 °C) 71 18 (!) 165/110 97 %   23 0435 98.2 °F (36.8 °C) 74 17 (!) 165/109 98 %   23 2351 97 °F (36.1 °C) 79 17 (!) 154/106 97 %   23 -- 71 17 (!) 176/122 98 %   23 1600 97.9 °F (36.6 °C) 75 17 (!) 159/100 95 %         Oxygen Therapy  SpO2: 95 %  Pulse Oximeter Device Mode: Continuous  O2 Device: None (Room air)    Estimated body mass index is 36.62 kg/m² as calculated from the following:    Height as of this encounter: 5' 9\" (1.753 m). Weight as of this encounter: 248 lb (112.5 kg).   No intake or output data in the 24 hours ending 23 1327        Physical Exam:    Blood pressure (!) 144/101, pulse 67, temperature 97.7 °F (36.5 °C), temperature source Axillary, resp. rate 18, height 5' 9\" (1.753 m), weight 248 lb (112.5 kg), SpO2 95 %. General:    Well nourished. Slurred speech. Not in any cardiopulmonary distress  CV:   RRR. No m/r/g. Lungs:   CTAB. No wheezing, rhonchi, or rales. Symmetric expansion. Abdomen:   Soft, nontender, nondistended. Extremities: No cyanosis or clubbing. No edema  Skin:     No rashes and normal coloration. Warm and dry. Neuro:  left hemiplegia arm 0/5, leg 3/5  Psych:  Normal mood and affect. I have personally reviewed labs and tests:  Recent Labs:  Recent Results (from the past 24 hour(s))   POCT Glucose    Collection Time: 02/17/23  3:14 PM   Result Value Ref Range    POC Glucose 92 65 - 100 mg/dL    Performed by: [facility]    POCT Glucose    Collection Time: 02/17/23  9:09 PM   Result Value Ref Range    POC Glucose 98 65 - 100 mg/dL    Performed by: [facility]    POCT Glucose    Collection Time: 02/18/23  6:30 AM   Result Value Ref Range    POC Glucose 101 (H) 65 - 100 mg/dL    Performed by: [address]    POCT Glucose    Collection Time: 02/18/23 11:20 AM   Result Value Ref Range    POC Glucose 101 (H) 65 - 100 mg/dL    Performed by: Hanny Oliver        I have personally reviewed imaging studies:  CT HEAD WO CONTRAST    Result Date: 2/16/2023  Exam: CT CODE NEURO HEAD WO CONTRAST on 2/16/2023 11:56 AM Clinical History: The Male patient is 59years old  presenting for General malaise with left-sided deficits. Technique: Thin slice axial CT images through the brain were obtained. All CT scans at this facility are performed using dose reduction/dose modulation techniques, as appropriate the performed exam, including the following: Automated Exposure Control;  Adjustment of the mA and/or kV according to patient size (this includes techniques or standardized protocols for targeted exams where dose is matched to indication/reason for exam); and Use of Iterative Reconstruction Technique. Radiation Exposure Indices: Reference Air Kerma (Ka,r) = 1225 mGy-cm Comparison:  None. Findings:  Cerebrum: Remote encephalomalacic changes throughout right medial temporal lobe and insula with focal encephalomalacia which extends into the corona radiata. Otherwise mild chronic periventricular white matter disease. No evidence of intracranial hemorrhage, mass, or other space-occupying lesion is seen. There are no abnormal extra-axial fluid collections. Cerebellum: Normal cerebellar morphology is demonstrated. CSF spaces: The ventricular system is within normal limits. The basilar cisterns are unremarkable. Brainstem: No evidence of ischemia, hemorrhage, or mass. Extracranial tissues: Visualized orbits and extracranial soft tissues are unremarkable. Paranasal sinuses/Mastoids: Well-pneumatized and aerated. . Calvarium: No acute osseous abnormality. 1.  Remote medial right temporal and insular ischemic changes otherwise chronic microvascular disease. CPT code 84358    CT BRAIN PERFUSION    Result Date: 2/16/2023  Exam: CT BRAIN PERFUSION on 2/16/2023 12:25 PM Clinical History: The Male patient is 59years old  presenting for Dysarthria. COMPARISON: Head CT 2/16/2023 TECHNIQUE: CT perfusion of the brain was obtained after the administration of intravenous contrast.  Perfusion maps and perfusion analysis output were generated using the VI[employer] ContaCt perfusion processing software algorithm for LVO detection. Radiation dose reduction techniques were used for this study: All CT scans performed at this facility use one or all of the following: Automated exposure control, adjustment of the mA and/or kVp according to patient's size, iterative reconstruction. Total radiation dose: 2139 mGy-cm FINDINGS: Study is technically adequate. Adequate vascular enhancement is demonstrated.  RAPID Output Values: CBF < 30% volume (best correlation with core infarct volume without overcalls): 0 ml (core infarction volume greater than 50 cc associated with poor outcomes) Tmax > 6 seconds: 28 ml Tmax/CBF Mismatch Volume: 28 ml Tmax/CBF Mismatch Ratio: None Hypoperfusion Intensity Ratio (Tmax > 10 seconds / Tmax > 6 seconds): 0 (values greater than 0.5 associated with poor outcome) Tmax > 10 seconds Volume: 0 ml (volume greater than 100 mL is associated with poor outcome)     1. No evidence of core CT cerebral perfusion defect. 2. Hypoperfusion changes involving the right cerebellum and occipital lobe. Please note that the determination of patient treatment is not based solely upon imaging factors or calculation values. Management of ischemia is at the discretion of the primary physician and is based upon a combination of clinical and imaging data, along other factors. CTA HEAD NECK W CONTRAST    Result Date: 2/16/2023  HISTORY: 59years of age Male with slurred speech and weakness. History of stroke. EXAM: CTA HEAD NECK W CONTRAST. Radiation reduction dose techniques were used for the study. Our CT scanner use one or all of the following- Automated exposure control, adjustment of the mA and/or KV according the patient size, iterative reconstruction. 3-D multiplanar reformations were performed at the workstation. All carotid artery stenosis percentages are based upon NASCET criteria if appropriate. Contrast: 100 ml of Isovue-370 intravenous contrast was administered. Per the CT technologist, the study was analyzed by the [address] algorithm. The radiologists have been asked by the hospital administration to designate when CTA studies are sent to [address] ai even though this algorithm is not used by the radiologist for exam interpretation. COMPARISON: No prior vascular imaging of the head or neck available. FINDINGS: VASCULAR FINDINGS: Cervical CTA: Visualized Aorta: There is a three-vessel branching pattern of the aortic arch. No significant atherosclerotic disease of the aortic arch.  Right Subclavian Artery: Grossly patent. Left Subclavian Artery: Grossly Patent. Right Carotid Arteries: Common Carotid Artery: Patent without significant stenosis. External Carotid Artery: Grossly patent. Carotid Bulb and Proximal ICA: No significant atherosclerotic disease or stenosis. Cervical Right ICA: Patent. Vertebral Artery Dominance: Codominant. Cervical Right Vertebral Artery: Patent. Left Carotid Arteries: Common Carotid Artery: Patent without significant stenosis. External Carotid Artery: Grossly patent. Carotid Bulb and Proximal ICA: No significant atherosclerotic disease or stenosis. Cervical Left ICA: Patent. Cervical Left Vertebral Artery: Patent. Cranial CTA: Right Internal Carotid Artery: Patent. Left Internal Carotid Artery: Patent. Intracranial ICA Atherosclerotic Disease: There is no significant atherosclerotic disease of the bilateral ICAs. Anterior Cerebral Arteries: Right A1 Segment: Patent. Left A1: Patent. Bilateral A2 and A3 segments appear grossly patent. Right Middle Cerebral Arteries: Right M1 segment is patent. Major proximal MCA branches beyond the bifurcation are patent. Left Middle Cerebral Arteries: Left M1 segment is patent. Major proximal MCA branches beyond the bifurcation are patent. Right Vertebral Artery: Patent. Left Vertebral Artery: Patent. Basilar Artery: Patent. Right Posterior Cerebral Artery: Patent. Left Posterior Cerebral Artery: Patent. Intracranial Aneurysms: None visualized. . Intracranial Proximal Vessel Occlusion: None. Major Dural Venous Sinuses: Grossly patent where adequately visualized. NONVASCULAR FINDINGS: Head: Brain Parenchyma: Findings of old right basal ganglia and insular infarct. No evidence of intracranial hemorrhage. Orbital Structures: Grossly unremarkable. Calvarial/Maxillofacial Soft Tissues: No evidence of significant acute abnormality in the calvarial or maxillofacial soft tissues. Neck: Thyroid gland:Subcentimeter hypodense nodule in the right thyroid lobe.  Cervical and Upper Mediastinal Adenopathy: No significant adenopathy. Upper Lungs: Without areas of prominent focal consolidation or masses. OSSEOUS STRUCTURES: Mastoid Air Cells: Unremarkable. Paranasal Sinuses: No evidence of significant mucoperiosteal thickening. Cervical Spine: Mild degenerative changes of the mid to lower cervical spine. 1. Negative CTA exam of the major cervical arterial vasculature for significant stenosis or occlusion. 2. Negative CTA exam of the major intracranial arterial vasculature for significant proximal vessel stenosis, occlusion, or aneurysms. 3. Findings of old right basal ganglia and right insular infarct. Echocardiogram:  No results found for this or any previous visit.         Orders Placed This Encounter   Medications    iopamidol (ISOVUE-370) 76 % injection 100 mL    OR Linked Order Group     ondansetron (ZOFRAN-ODT) disintegrating tablet 4 mg     ondansetron (ZOFRAN) injection 4 mg    polyethylene glycol (GLYCOLAX) packet 17 g    DISCONTD: enoxaparin (LOVENOX) injection 40 mg     Order Specific Question:   Indication of Use     Answer:   Prophylaxis-DVT/PE    DISCONTD: aspirin EC tablet 81 mg    DISCONTD: aspirin suppository 300 mg    atorvastatin (LIPITOR) tablet 80 mg    labetalol (NORMODYNE;TRANDATE) injection 10 mg    acetaminophen (TYLENOL) tablet 650 mg    DISCONTD: atorvastatin (LIPITOR) tablet 80 mg    bictegravir-emtricitab-tenofovir alafenamide (BIKTARVY) -25 MG per tablet 1 tablet    FLUoxetine (PROZAC) capsule 20 mg    HYDROcodone-acetaminophen (NORCO) 7.5-325 MG per tablet 1 tablet    QUEtiapine (SEROQUEL) tablet 25 mg    venlafaxine (EFFEXOR XR) extended release capsule 150 mg    DISCONTD: enoxaparin Sodium (LOVENOX) injection 30 mg     Order Specific Question:   Indication of Use     Answer:   Prophylaxis-DVT/PE    metoprolol tartrate (LOPRESSOR) tablet 50 mg    insulin lispro (HUMALOG) injection vial 0-4 Units    insulin lispro (HUMALOG) injection vial 0-4 Units    glucose chewable tablet 16 g    OR Linked Order Group     dextrose bolus 10% 125 mL     dextrose bolus 10% 250 mL    glucagon (rDNA) injection 1 mg    dextrose 10 % infusion    potassium chloride (KLOR-CON M) extended release tablet 40 mEq    DISCONTD: apixaban (ELIQUIS) tablet 5 mg     Order Specific Question:   Indication of Use     Answer:   A Fib/A Flutter    aspirin EC tablet 81 mg    lisinopril (PRINIVIL;ZESTRIL) tablet 10 mg         Signed:  Cornelia Doran MD

## 2023-02-18 NOTE — PROGRESS NOTES
Neurology Daily Progress Note     Assessment:     61-year-old man with a history of stroke. The patient presented with severe dysarthria and has an acute cerebellar stroke. This occurred while the patient was off Eliquis    Plan:     Eliquis will be resumed on Sunday morning 2/19. Risk of hemorrhagic transformation at that point will be low    Continue to work with therapies    Follow-up with Mauricio Loyola NP in [facility] clinic. Neurology will place this order. Subjective: Interval history:    61-year-old man presenting with dysarthria. Today, he is doing about the same. MRI shows a cerebellar stroke    History:    Fritzi Cushing is a 59 y.o. male who is being seen for stroke. Past Medical History:   Diagnosis Date    Stroke [facility])      No past surgical history on file. No family history on file.   Social History     Tobacco Use    Smoking status: Never    Smokeless tobacco: Never   Substance Use Topics    Alcohol use: Not Currently      Current Facility-Administered Medications   Medication Dose Route Frequency Provider Last Rate Last Admin    lisinopril (PRINIVIL;ZESTRIL) tablet 10 mg  10 mg Oral Daily Jennifer Hamilton MD        aspirin EC tablet 81 mg  81 mg Oral Daily Jennifer Hamilton MD   81 mg at 02/18/23 0825    ondansetron (ZOFRAN-ODT) disintegrating tablet 4 mg  4 mg Oral Q8H PRN Sanam Rios MD        Or    ondansetron (ZOFRAN) injection 4 mg  4 mg IntraVENous Q6H PRN Sanam Rios MD        polyethylene glycol (GLYCOLAX) packet 17 g  17 g Oral Daily PRN Sanam Rios MD        atorvastatin (LIPITOR) tablet 80 mg  80 mg Oral Nightly Sanam Rios MD   80 mg at 02/17/23 2223    labetalol (NORMODYNE;TRANDATE) injection 10 mg  10 mg IntraVENous Q10 Min PRN Sanam Rios MD        acetaminophen (TYLENOL) tablet 650 mg  650 mg Oral Q4H PRN Sanam Rios MD        bictegravir-emtricitab-tenofovir alafenamide (BIKTARVY) -25 MG per tablet 1 tablet  1 tablet Oral Daily Zee Younger MD   1 tablet at 02/18/23 0923    FLUoxetine (PROZAC) capsule 20 mg  20 mg Oral Daily Zee Younger MD   20 mg at 02/18/23 0825    HYDROcodone-acetaminophen (Francisco Awe) 7.5-325 MG per tablet 1 tablet  1 tablet Oral Q8H PRN Zee Younger MD        QUEtiapine (SEROQUEL) tablet 25 mg  25 mg Oral Nightly Zee Younger MD   25 mg at 02/17/23 2223    venlafaxine (EFFEXOR XR) extended release capsule 150 mg  150 mg Oral BID Zee Younger MD   150 mg at 02/18/23 0824    metoprolol tartrate (LOPRESSOR) tablet 50 mg  50 mg Oral BID Zee Younger MD   50 mg at 02/18/23 0825    insulin lispro (HUMALOG) injection vial 0-4 Units  0-4 Units SubCUTAneous TID WC Zee Younger MD        insulin lispro (HUMALOG) injection vial 0-4 Units  0-4 Units SubCUTAneous Nightly Zee Younger MD        glucose chewable tablet 16 g  4 tablet Oral PRN Zee Younger MD        dextrose bolus 10% 125 mL  125 mL IntraVENous PRN Zee Younger MD        Or    dextrose bolus 10% 250 mL  250 mL IntraVENous PRN Zee Younger MD        glucagon (rDNA) injection 1 mg  1 mg SubCUTAneous PRN Zee Younger MD        dextrose 10 % infusion   IntraVENous Continuous PRN Zee Younger MD            Allergies   Allergen Reactions    Amlodipine Shortness Of Breath       Review of systems negative with exception of pertinent positives and negatives noted above.        Objective:     Vitals:    02/17/23 2351 02/18/23 0435 02/18/23 0812 02/18/23 1119   BP: (!) 154/106 (!) 165/109 (!) 165/110 (!) 144/101   Pulse: 79 74 71 67   Resp: 17 17 18 18   Temp: 97 °F (36.1 °C) 98.2 °F (36.8 °C) 98.6 °F (37 °C) 97.7 °F (36.5 °C)   TempSrc: Axillary Oral Oral Axillary   SpO2: 97% 98% 97% 95%   Weight:       Height:              Current Facility-Administered Medications:     lisinopril (PRINIVIL;ZESTRIL) tablet 10 mg, 10 mg, Oral, Daily, Jose Keller MD    aspirin EC tablet 81 mg, 81 mg, Oral, Daily, Jose Keller MD, 81 mg at 02/18/23 0825    ondansetron (ZOFRAN-ODT) disintegrating tablet 4 mg, 4 mg, Oral, Q8H PRN **OR** ondansetron (ZOFRAN) injection 4 mg, 4 mg, IntraVENous, Q6H PRN, Vitaliy Rodriguez MD    polyethylene glycol (GLYCOLAX) packet 17 g, 17 g, Oral, Daily PRN, Vitaliy Rodriguez MD    atorvastatin (LIPITOR) tablet 80 mg, 80 mg, Oral, Nightly, Vitaliy Rodriguez MD, 80 mg at 02/17/23 2223    labetalol (NORMODYNE;TRANDATE) injection 10 mg, 10 mg, IntraVENous, Q10 Min PRN, Vitaliy Rodriguez MD    acetaminophen (TYLENOL) tablet 650 mg, 650 mg, Oral, Q4H PRN, Vitaliy Rodriguez MD    bictegravir-emtricitab-tenofovir alafenamide (BIKTARVY) -25 MG per tablet 1 tablet, 1 tablet, Oral, Daily, Vitaliy Rodriguez MD, 1 tablet at 02/18/23 0923    FLUoxetine (PROZAC) capsule 20 mg, 20 mg, Oral, Daily, Vitaliy Rodriguez MD, 20 mg at 02/18/23 0825    HYDROcodone-acetaminophen ([facility]) 7.5-325 MG per tablet 1 tablet, 1 tablet, Oral, Q8H PRN, Vitaliy Rodriguez MD    QUEtiapine (SEROQUEL) tablet 25 mg, 25 mg, Oral, Nightly, Vitaliy Rodriguez MD, 25 mg at 02/17/23 2223    venlafaxine (EFFEXOR XR) extended release capsule 150 mg, 150 mg, Oral, BID, Vitaliy Rodriguez MD, 150 mg at 02/18/23 0824    metoprolol tartrate (LOPRESSOR) tablet 50 mg, 50 mg, Oral, BID, Vitaliy Rodriguez MD, 50 mg at 02/18/23 0825    insulin lispro (HUMALOG) injection vial 0-4 Units, 0-4 Units, SubCUTAneous, TID WC, Vitaliy Rodriguez MD    insulin lispro (HUMALOG) injection vial 0-4 Units, 0-4 Units, SubCUTAneous, Nightly, Vitaliy Rodriguez MD    glucose chewable tablet 16 g, 4 tablet, Oral, PRN, Vitaliy Rodriguez MD    dextrose bolus 10% 125 mL, 125 mL, IntraVENous, PRN **OR** dextrose bolus 10% 250 mL, 250 mL, IntraVENous, PRN, Vitaliy Rodriguez MD    glucagon (rDNA) injection 1 mg, 1 mg, SubCUTAneous, PRN, Vitaliy Rodriguez MD    dextrose 10 % infusion, , IntraVENous, Continuous PRN, Vitaliy Rodriguez MD    Recent Results (from the past 12 hour(s))   POCT Glucose    Collection Time: 02/18/23  6:30 AM   Result Value Ref Range    POC Glucose 101 (H) 65 - 100 mg/dL    [facility]: [facility]    POCT Glucose    Collection Time: 02/18/23 11:20 AM   Result Value Ref Range    POC Glucose 101 (H) 65 - 100 mg/dL    Performed by: Angel          Most recent MRI   Results for orders placed during the hospital encounter of 02/16/23    MRI BRAIN WO CONTRAST    Narrative  MRI brain without contrast:    History: Acute onset dysarthria    Imaging sequences: Multiplanar multisequence imaging was performed on a 1.5  Skylar magnet.    Comparison: None. Correlation is made to the CT scan of the brain and CT  perfusion study performed earlier on the same day.    Findings: There are remote infarctions within the right basal ganglia/corona  radiata. There is associated hemosiderin staining There is mild ex vacuo  dilatation of the body right lateral ventricle. The ventricles are otherwise  normal in size and configuration.    There is asymmetric prominence to the extra-axial space within the right middle  cranial fossa suspicious for an arachnoid cyst measuring up to 1.9 cm. There are  no additional extra-axial fluid collections.  Normal flow voids are present  within all of the major intracranial vessels.  No evidence of acute  intraparenchymal hemorrhage or mass effect is identified.    There is an acute infarction within the right cerebellar hemisphere within the  superior cerebellar artery distribution.    Patchy and discrete foci of T2 prolongation are present within the  supratentorial white matter.  These are nonspecific findings but would be most  compatible with moderate chronic small vessel ischemic changes.    There are a few opacified right mastoid air cells. There is a lipoma within the  midline occipital soft tissues measuring up to 2.9 cm.    Impression  1. Acute right cerebellar infarction.  2. Moderate chronic small vessel ischemic change.  3. Remote right basal ganglia/corona radiata  lacunar infarction.                Physical Exam:  General - Well developed, well nourished, in no apparent distress. Pleasant and conversant.   HEENT - Normocephalic, atraumatic. Conjunctiva are clear.   Neck - Supple without masses  Extremities - Peripheral pulses intact. No edema and no rashes.     Neurological examination -     Neurological examination is unchanged from yesterday.  He has hemiparesis on the left side of the body which is chronic.  He has cerebellar dysarthria/scanning speech.      Signed By: Prashanth Giang DO     February 18, 2023

## 2023-02-19 LAB
GLUCOSE BLD STRIP.AUTO-MCNC: 113 MG/DL (ref 65–100)
GLUCOSE BLD STRIP.AUTO-MCNC: 88 MG/DL (ref 65–100)
GLUCOSE BLD STRIP.AUTO-MCNC: 91 MG/DL (ref 65–100)
SERVICE CMNT-IMP: ABNORMAL
SERVICE CMNT-IMP: NORMAL
SERVICE CMNT-IMP: NORMAL

## 2023-02-19 PROCEDURE — 6370000000 HC RX 637 (ALT 250 FOR IP): Performed by: FAMILY MEDICINE

## 2023-02-19 PROCEDURE — 82962 GLUCOSE BLOOD TEST: CPT

## 2023-02-19 PROCEDURE — 1100000000 HC RM PRIVATE

## 2023-02-19 PROCEDURE — 6370000000 HC RX 637 (ALT 250 FOR IP): Performed by: HOSPITALIST

## 2023-02-19 RX ORDER — LISINOPRIL 20 MG/1
20 TABLET ORAL DAILY
Status: DISCONTINUED | OUTPATIENT
Start: 2023-02-19 | End: 2023-02-20 | Stop reason: HOSPADM

## 2023-02-19 RX ORDER — LISINOPRIL 20 MG/1
20 TABLET ORAL DAILY
Status: DISCONTINUED | OUTPATIENT
Start: 2023-02-20 | End: 2023-02-19

## 2023-02-19 RX ADMIN — ASPIRIN 81 MG: 81 TABLET ORAL at 08:52

## 2023-02-19 RX ADMIN — LISINOPRIL 20 MG: 20 TABLET ORAL at 14:12

## 2023-02-19 RX ADMIN — ATORVASTATIN CALCIUM 80 MG: 80 TABLET, FILM COATED ORAL at 22:05

## 2023-02-19 RX ADMIN — APIXABAN 5 MG: 5 TABLET, FILM COATED ORAL at 14:12

## 2023-02-19 RX ADMIN — QUETIAPINE FUMARATE 25 MG: 25 TABLET ORAL at 22:05

## 2023-02-19 RX ADMIN — METOPROLOL TARTRATE 50 MG: 50 TABLET, FILM COATED ORAL at 22:06

## 2023-02-19 RX ADMIN — LISINOPRIL 10 MG: 5 TABLET ORAL at 08:54

## 2023-02-19 RX ADMIN — METOPROLOL TARTRATE 50 MG: 50 TABLET, FILM COATED ORAL at 08:54

## 2023-02-19 RX ADMIN — BICTEGRAVIR SODIUM, EMTRICITABINE, AND TENOFOVIR ALAFENAMIDE FUMARATE 1 TABLET: 50; 200; 25 TABLET ORAL at 08:57

## 2023-02-19 RX ADMIN — APIXABAN 5 MG: 5 TABLET, FILM COATED ORAL at 22:05

## 2023-02-19 RX ADMIN — FLUOXETINE 20 MG: 10 CAPSULE ORAL at 08:51

## 2023-02-19 RX ADMIN — VENLAFAXINE HYDROCHLORIDE 150 MG: 37.5 CAPSULE, EXTENDED RELEASE ORAL at 08:51

## 2023-02-19 RX ADMIN — VENLAFAXINE HYDROCHLORIDE 150 MG: 37.5 CAPSULE, EXTENDED RELEASE ORAL at 22:05

## 2023-02-19 ASSESSMENT — PAIN SCALES - GENERAL: PAINLEVEL_OUTOF10: 0

## 2023-02-19 NOTE — PLAN OF CARE
Problem: Discharge Planning  Goal: Discharge to home or other facility with appropriate resources  Outcome: Progressing     Problem: Pain  Goal: Verbalizes/displays adequate comfort level or baseline comfort level  Outcome: Progressing     Problem: Skin/Tissue Integrity  Goal: Absence of new skin breakdown  Description: 1. Monitor for areas of redness and/or skin breakdown  2. Assess vascular access sites hourly  3. Every 4-6 hours minimum:  Change oxygen saturation probe site  4. Every 4-6 hours:  If on nasal continuous positive airway pressure, respiratory therapy assess nares and determine need for appliance change or resting period.   Outcome: Progressing     Problem: Safety - Adult  Goal: Free from fall injury  Outcome: Progressing     Problem: ABCDS Injury Assessment  Goal: Absence of physical injury  Outcome: Progressing     Problem: Chronic Conditions and Co-morbidities  Goal: Patient's chronic conditions and co-morbidity symptoms are monitored and maintained or improved  Outcome: Progressing  Flowsheets (Taken 2/18/2023 2000)  Care Plan - Patient's Chronic Conditions and Co-Morbidity Symptoms are Monitored and Maintained or Improved: Monitor and assess patient's chronic conditions and comorbid symptoms for stability, deterioration, or improvement     Problem: Neurosensory - Adult  Goal: Achieves stable or improved neurological status  Outcome: Progressing  Goal: Achieves maximal functionality and self care  Outcome: Progressing     Problem: Cardiovascular - Adult  Goal: Maintains optimal cardiac output and hemodynamic stability  Outcome: Progressing  Flowsheets (Taken 2/18/2023 2000)  Maintains optimal cardiac output and hemodynamic stability: Monitor blood pressure and heart rate  Goal: Absence of cardiac dysrhythmias or at baseline  Outcome: Progressing     Problem: Skin/Tissue Integrity - Adult  Goal: Skin integrity remains intact  Outcome: Progressing  Flowsheets (Taken 2/18/2023 2000)  Skin Integrity Remains Intact: Monitor for areas of redness and/or skin breakdown  Goal: Incisions, wounds, or drain sites healing without S/S of infection  Outcome: Progressing     Problem: Musculoskeletal - Adult  Goal: Return mobility to safest level of function  Outcome: Progressing  Goal: Maintain proper alignment of affected body part  Outcome: Progressing  Goal: Return ADL status to a safe level of function  Outcome: Progressing     Problem: Metabolic/Fluid and Electrolytes - Adult  Goal: Hemodynamic stability and optimal renal function maintained  Outcome: Progressing  Goal: Glucose maintained within prescribed range  Outcome: Progressing

## 2023-02-19 NOTE — PROGRESS NOTES
Hospitalist   Admit Date:  2023 11:10 AM   Name:  Salvatore Zhao   Age:  59 y.o. Sex:  male  :  1965   MRN:  [id number]   Room:        History of Present Illness:     59 y.o. male with medical history of HTN, HIVhyperlipidemia, chronic A-fib, history of CVA with left hemiplegia, anxiety/depression. Presented to ER with a headache- mild, dull and speech problems- mild to moderate  Code S was called. CT head, CTA head and neck no acute findings. Not a candidate for thrombectomy. Ekg -Atrial flutter  Admitted for slurred speech/headache- prob cva    Today, speech abnormality has not improved but take PO, no change, BP not well controlled    Assessment & Plan:     1- Acute right ischemic cerebellar infarct, with slurred speech, has hx of stroke in the past with residual left weakness (arm>leg). Hx of afib and ran out of Eliquis for 1 week pre-admission. 2- HTN, not well controlled, meds advacned  As per Neurology no need for permissive blood pressure  3- DM type 2, controlled    Rx:  Eliquis today  Neuro monitoring  BP control- meds advanced  Insulin scale  ASA  Statin     Dispo; home health tomorrow, if BP better controlled    Objective:   Patient Vitals for the past 24 hrs:   Temp Pulse Resp BP SpO2   23 1216 -- 65 18 (!) 168/116 98 %   23 0800 98.1 °F (36.7 °C) 67 16 (!) 151/105 96 %   23 0400 97.9 °F (36.6 °C) 69 18 (!) 160/112 99 %   23 0000 97.6 °F (36.4 °C) 60 17 (!) 145/100 99 %   23 2000 97.3 °F (36.3 °C) 69 18 (!) 159/117 96 %   23 1549 98.8 °F (37.1 °C) 56 18 (!) 167/117 95 %         Oxygen Therapy  SpO2: 98 %  Pulse Oximeter Device Mode: Continuous  O2 Device: None (Room air)    Estimated body mass index is 36.62 kg/m² as calculated from the following:    Height as of this encounter: 5' 9\" (1.753 m). Weight as of this encounter: 248 lb (112.5 kg).   No intake or output data in the 24 hours ending 23 1226        Physical Exam:    Blood pressure (!) 168/116, pulse 65, temperature 98.1 °F (36.7 °C), temperature source Oral, resp. rate 18, height 5' 9\" (1.753 m), weight 248 lb (112.5 kg), SpO2 98 %. General:    Well nourished. Slurred speech. Not in any cardiopulmonary distress  CV:   RRR. No m/r/g. Lungs:   CTAB. No wheezing, rhonchi, or rales. Symmetric expansion. Abdomen:   Soft, nontender, nondistended. Extremities: No cyanosis or clubbing. No edema  Skin:     No rashes and normal coloration. Warm and dry. Neuro:  left hemiplegia arm 0/5, leg 3/5  Psych:  Normal mood and affect. I have personally reviewed labs and tests:  Recent Labs:  Recent Results (from the past 24 hour(s))   POCT Glucose    Collection Time: 02/18/23  3:50 PM   Result Value Ref Range    POC Glucose 103 (H) 65 - 100 mg/dL    Performed by: Angel    POCT Glucose    Collection Time: 02/18/23  9:12 PM   Result Value Ref Range    POC Glucose 136 (H) 65 - 100 mg/dL    Performed by: Christiano(PCT)SNE    POCT Glucose    Collection Time: 02/19/23  6:07 AM   Result Value Ref Range    POC Glucose 88 65 - 100 mg/dL    Performed by: Christiano(PCT)SNE    POCT Glucose    Collection Time: 02/19/23 11:50 AM   Result Value Ref Range    POC Glucose 113 (H) 65 - 100 mg/dL    Performed by: Benigno Kellogg        I have personally reviewed imaging studies:  CT HEAD WO CONTRAST    Result Date: 2/16/2023  Exam: CT CODE NEURO HEAD WO CONTRAST on 2/16/2023 11:56 AM Clinical History: The Male patient is 59years old  presenting for General malaise with left-sided deficits. Technique: Thin slice axial CT images through the brain were obtained. All CT scans at this facility are performed using dose reduction/dose modulation techniques, as appropriate the performed exam, including the following: Automated Exposure Control;  Adjustment of the mA and/or kV according to patient size (this includes techniques or standardized protocols for targeted exams where dose is matched to indication/reason for exam); and Use of Iterative Reconstruction Technique. Radiation Exposure Indices: Reference Air Kerma (Ka,r) = 1225 mGy-cm Comparison:  None. Findings:  Cerebrum: Remote encephalomalacic changes throughout right medial temporal lobe and insula with focal encephalomalacia which extends into the corona radiata. Otherwise mild chronic periventricular white matter disease. No evidence of intracranial hemorrhage, mass, or other space-occupying lesion is seen. There are no abnormal extra-axial fluid collections. Cerebellum: Normal cerebellar morphology is demonstrated. CSF spaces: The ventricular system is within normal limits. The basilar cisterns are unremarkable. Brainstem: No evidence of ischemia, hemorrhage, or mass. Extracranial tissues: Visualized orbits and extracranial soft tissues are unremarkable. Paranasal sinuses/Mastoids: Well-pneumatized and aerated. . Calvarium: No acute osseous abnormality. 1.  Remote medial right temporal and insular ischemic changes otherwise chronic microvascular disease. CPT code 59194    CT BRAIN PERFUSION    Result Date: 2/16/2023  Exam: CT BRAIN PERFUSION on 2/16/2023 12:25 PM Clinical History: The Male patient is 59years old  presenting for Dysarthria. COMPARISON: Head CT 2/16/2023 TECHNIQUE: CT perfusion of the brain was obtained after the administration of intravenous contrast.  Perfusion maps and perfusion analysis output were generated using the VIZ ContaCt perfusion processing software algorithm for LVO detection. Radiation dose reduction techniques were used for this study: All CT scans performed at this facility use one or all of the following: Automated exposure control, adjustment of the mA and/or kVp according to patient's size, iterative reconstruction. Total radiation dose: 2139 mGy-cm FINDINGS: Study is technically adequate. Adequate vascular enhancement is demonstrated.  RAPID Output Values: CBF < 30% volume (best correlation with core infarct volume without overcalls): 0 ml (core infarction volume greater than 50 cc associated with poor outcomes) Tmax > 6 seconds: 28 ml Tmax/CBF Mismatch Volume: 28 ml Tmax/CBF Mismatch Ratio: None Hypoperfusion Intensity Ratio (Tmax > 10 seconds / Tmax > 6 seconds): 0 (values greater than 0.5 associated with poor outcome) Tmax > 10 seconds Volume: 0 ml (volume greater than 100 mL is associated with poor outcome)     1. No evidence of core CT cerebral perfusion defect. 2. Hypoperfusion changes involving the right cerebellum and occipital lobe. Please note that the determination of patient treatment is not based solely upon imaging factors or calculation values. Management of ischemia is at the discretion of the primary physician and is based upon a combination of clinical and imaging data, along other factors. CTA HEAD NECK W CONTRAST    Result Date: 2/16/2023  HISTORY: 59years of age Male with slurred speech and weakness. History of stroke. EXAM: CTA HEAD NECK W CONTRAST. Radiation reduction dose techniques were used for the study. Our CT scanner use one or all of the following- Automated exposure control, adjustment of the mA and/or KV according the patient size, iterative reconstruction. 3-D multiplanar reformations were performed at the workstation. All carotid artery stenosis percentages are based upon NASCET criteria if appropriate. Contrast: 100 ml of Isovue-370 intravenous contrast was administered. Per the CT technologist, the study was analyzed by the [address] algorithm. The radiologists have been asked by the hospital administration to designate when CTA studies are sent to [address] ai even though this algorithm is not used by the radiologist for exam interpretation. COMPARISON: No prior vascular imaging of the head or neck available. FINDINGS: VASCULAR FINDINGS: Cervical CTA: Visualized Aorta: There is a three-vessel branching pattern of the aortic arch.  No significant atherosclerotic disease of the aortic arch. Right Subclavian Artery: Grossly patent. Left Subclavian Artery: Grossly Patent. Right Carotid Arteries: Common Carotid Artery: Patent without significant stenosis. External Carotid Artery: Grossly patent. Carotid Bulb and Proximal ICA: No significant atherosclerotic disease or stenosis. Cervical Right ICA: Patent. Vertebral Artery Dominance: Codominant. Cervical Right Vertebral Artery: Patent. Left Carotid Arteries: Common Carotid Artery: Patent without significant stenosis. External Carotid Artery: Grossly patent. Carotid Bulb and Proximal ICA: No significant atherosclerotic disease or stenosis. Cervical Left ICA: Patent. Cervical Left Vertebral Artery: Patent. Cranial CTA: Right Internal Carotid Artery: Patent. Left Internal Carotid Artery: Patent. Intracranial ICA Atherosclerotic Disease: There is no significant atherosclerotic disease of the bilateral ICAs. Anterior Cerebral Arteries: Right A1 Segment: Patent. Left A1: Patent. Bilateral A2 and A3 segments appear grossly patent. Right Middle Cerebral Arteries: Right M1 segment is patent. Major proximal MCA branches beyond the bifurcation are patent. Left Middle Cerebral Arteries: Left M1 segment is patent. Major proximal MCA branches beyond the bifurcation are patent. Right Vertebral Artery: Patent. Left Vertebral Artery: Patent. Basilar Artery: Patent. Right Posterior Cerebral Artery: Patent. Left Posterior Cerebral Artery: Patent. Intracranial Aneurysms: None visualized. . Intracranial Proximal Vessel Occlusion: None. Major Dural Venous Sinuses: Grossly patent where adequately visualized. NONVASCULAR FINDINGS: Head: Brain Parenchyma: Findings of old right basal ganglia and insular infarct. No evidence of intracranial hemorrhage. Orbital Structures: Grossly unremarkable. Calvarial/Maxillofacial Soft Tissues: No evidence of significant acute abnormality in the calvarial or maxillofacial soft tissues.  Neck: Thyroid gland:Subcentimeter hypodense nodule in the right thyroid lobe. Cervical and Upper Mediastinal Adenopathy: No significant adenopathy. Upper Lungs: Without areas of prominent focal consolidation or masses. OSSEOUS STRUCTURES: Mastoid Air Cells: Unremarkable. Paranasal Sinuses: No evidence of significant mucoperiosteal thickening. Cervical Spine: Mild degenerative changes of the mid to lower cervical spine. 1. Negative CTA exam of the major cervical arterial vasculature for significant stenosis or occlusion. 2. Negative CTA exam of the major intracranial arterial vasculature for significant proximal vessel stenosis, occlusion, or aneurysms. 3. Findings of old right basal ganglia and right insular infarct. Echocardiogram:  No results found for this or any previous visit.         Orders Placed This Encounter   Medications    iopamidol (ISOVUE-370) 76 % injection 100 mL    OR Linked Order Group     ondansetron (ZOFRAN-ODT) disintegrating tablet 4 mg     ondansetron (ZOFRAN) injection 4 mg    polyethylene glycol (GLYCOLAX) packet 17 g    DISCONTD: enoxaparin (LOVENOX) injection 40 mg     Order Specific Question:   Indication of Use     Answer:   Prophylaxis-DVT/PE    DISCONTD: aspirin EC tablet 81 mg    DISCONTD: aspirin suppository 300 mg    atorvastatin (LIPITOR) tablet 80 mg    labetalol (NORMODYNE;TRANDATE) injection 10 mg    acetaminophen (TYLENOL) tablet 650 mg    DISCONTD: atorvastatin (LIPITOR) tablet 80 mg    bictegravir-emtricitab-tenofovir alafenamide (BIKTARVY) -25 MG per tablet 1 tablet    FLUoxetine (PROZAC) capsule 20 mg    HYDROcodone-acetaminophen (NORCO) 7.5-325 MG per tablet 1 tablet    QUEtiapine (SEROQUEL) tablet 25 mg    venlafaxine (EFFEXOR XR) extended release capsule 150 mg    DISCONTD: enoxaparin Sodium (LOVENOX) injection 30 mg     Order Specific Question:   Indication of Use     Answer:   Prophylaxis-DVT/PE    metoprolol tartrate (LOPRESSOR) tablet 50 mg    insulin lispro (HUMALOG) injection vial 0-4 Units    insulin lispro (HUMALOG) injection vial 0-4 Units    glucose chewable tablet 16 g    OR Linked Order Group     dextrose bolus 10% 125 mL     dextrose bolus 10% 250 mL    glucagon (rDNA) injection 1 mg    dextrose 10 % infusion    potassium chloride (KLOR-CON M) extended release tablet 40 mEq    DISCONTD: apixaban (ELIQUIS) tablet 5 mg     Order Specific Question:   Indication of Use     Answer:   A Fib/A Flutter    DISCONTD: aspirin EC tablet 81 mg    DISCONTD: lisinopril (PRINIVIL;ZESTRIL) tablet 10 mg    DISCONTD: lisinopril (PRINIVIL;ZESTRIL) tablet 20 mg    lisinopril (PRINIVIL;ZESTRIL) tablet 20 mg         Signed:  Chon Alcala MD

## 2023-02-20 VITALS
HEART RATE: 71 BPM | RESPIRATION RATE: 17 BRPM | DIASTOLIC BLOOD PRESSURE: 95 MMHG | OXYGEN SATURATION: 99 % | BODY MASS INDEX: 36.73 KG/M2 | HEIGHT: 69 IN | WEIGHT: 248 LBS | TEMPERATURE: 98.8 F | SYSTOLIC BLOOD PRESSURE: 133 MMHG

## 2023-02-20 LAB
GLUCOSE BLD STRIP.AUTO-MCNC: 101 MG/DL (ref 65–100)
GLUCOSE BLD STRIP.AUTO-MCNC: 87 MG/DL (ref 65–100)
GLUCOSE BLD STRIP.AUTO-MCNC: 91 MG/DL (ref 65–100)
SERVICE CMNT-IMP: ABNORMAL
SERVICE CMNT-IMP: NORMAL
SERVICE CMNT-IMP: NORMAL

## 2023-02-20 PROCEDURE — 6370000000 HC RX 637 (ALT 250 FOR IP): Performed by: HOSPITALIST

## 2023-02-20 PROCEDURE — 97112 NEUROMUSCULAR REEDUCATION: CPT

## 2023-02-20 PROCEDURE — 6370000000 HC RX 637 (ALT 250 FOR IP): Performed by: FAMILY MEDICINE

## 2023-02-20 PROCEDURE — 97530 THERAPEUTIC ACTIVITIES: CPT

## 2023-02-20 PROCEDURE — 97535 SELF CARE MNGMENT TRAINING: CPT

## 2023-02-20 PROCEDURE — 6360000002 HC RX W HCPCS: Performed by: STUDENT IN AN ORGANIZED HEALTH CARE EDUCATION/TRAINING PROGRAM

## 2023-02-20 PROCEDURE — 92526 ORAL FUNCTION THERAPY: CPT

## 2023-02-20 PROCEDURE — 92523 SPEECH SOUND LANG COMPREHEN: CPT

## 2023-02-20 PROCEDURE — 82962 GLUCOSE BLOOD TEST: CPT

## 2023-02-20 RX ORDER — LISINOPRIL 20 MG/1
20 TABLET ORAL DAILY
Qty: 90 TABLET | Refills: 0 | Status: SHIPPED | OUTPATIENT
Start: 2023-02-21 | End: 2023-05-22

## 2023-02-20 RX ORDER — HYDRALAZINE HYDROCHLORIDE 20 MG/ML
10 INJECTION INTRAMUSCULAR; INTRAVENOUS EVERY 8 HOURS PRN
Status: DISCONTINUED | OUTPATIENT
Start: 2023-02-20 | End: 2023-02-20 | Stop reason: HOSPADM

## 2023-02-20 RX ORDER — ATORVASTATIN CALCIUM 80 MG/1
80 TABLET, FILM COATED ORAL NIGHTLY
Qty: 90 TABLET | Refills: 0 | Status: SHIPPED | OUTPATIENT
Start: 2023-02-20 | End: 2023-05-21

## 2023-02-20 RX ADMIN — VENLAFAXINE HYDROCHLORIDE 150 MG: 37.5 CAPSULE, EXTENDED RELEASE ORAL at 08:13

## 2023-02-20 RX ADMIN — METOPROLOL TARTRATE 50 MG: 50 TABLET, FILM COATED ORAL at 08:12

## 2023-02-20 RX ADMIN — FLUOXETINE 20 MG: 10 CAPSULE ORAL at 08:13

## 2023-02-20 RX ADMIN — LISINOPRIL 20 MG: 20 TABLET ORAL at 08:13

## 2023-02-20 RX ADMIN — APIXABAN 5 MG: 5 TABLET, FILM COATED ORAL at 08:13

## 2023-02-20 RX ADMIN — HYDRALAZINE HYDROCHLORIDE 10 MG: 20 INJECTION INTRAMUSCULAR; INTRAVENOUS at 06:40

## 2023-02-20 RX ADMIN — BICTEGRAVIR SODIUM, EMTRICITABINE, AND TENOFOVIR ALAFENAMIDE FUMARATE 1 TABLET: 50; 200; 25 TABLET ORAL at 08:17

## 2023-02-20 ASSESSMENT — PAIN SCALES - GENERAL: PAINLEVEL_OUTOF10: 0

## 2023-02-20 NOTE — DISCHARGE SUMMARY
[unfilled]    Physician Discharge Summary     Patient ID:  Félix Zhao  [id number]  59 y.o.  1965    Admit date: 2/16/2023    Discharge date: 2-    Diagnosis:  1- Acute right ischemic cerebellar infarct, with slurred speech, has hx of stroke in the past with residual left weakness (arm>leg). Hx of afib and ran out of Eliquis for 1 week pre-admission. Higher statin dose, restarted Eliquis day before discharge. Seen by Neurology. 2- Hypertension, not well controlled, meds advanced, improved. 3- NIDDM, controlled    MRI brain;  1. Acute right cerebellar infarction. 2. Moderate chronic small vessel ischemic change. 3. Remote right basal ganglia/corona radiata lacunar infarction. Hospital course:   Per HPI:  59 y.o. male with medical history of HTN, HIVhyperlipidemia, chronic A-fib, history of CVA with left hemiplegia, anxiety/depression. Presented to ER with a headache- mild, dull and speech problems- mild to moderate  Code S was called. CT head, CTA head and neck no acute findings. Not a candidate for thrombectomy. Ekg -Atrial flutter  Admitted for slurred speech/headache- prob cva  Patient admitted and treated as above. On day of discharge, patient exam showed motor left arm 0/5, left leg 3/5, mild to moderate droop, tolerates oral intake.     PCP: Chaparro Carbajal PA    Patient Instructions:   Current Discharge Medication List        START taking these medications    Details   lisinopril (PRINIVIL;ZESTRIL) 20 MG tablet Take 1 tablet by mouth daily  Qty: 90 tablet, Refills: 0           CONTINUE these medications which have CHANGED    Details   atorvastatin (LIPITOR) 80 MG tablet Take 1 tablet by mouth nightly  Qty: 90 tablet, Refills: 0           CONTINUE these medications which have NOT CHANGED    Details   Handicap Placard [facility] prescription to [facility] with completed form [id number].      acetaminophen (TYLENOL) 325 mg tablet Take 650 mg by mouth every 4 hours as needed apixaban (ELIQUIS) 5 MG TABS tablet 2 times daily      BIKTARVY -25 MG TABS per tablet TAKE 1 TABLET BY MOUTH ONCE DAILY      lidocaine 4 % external patch Lidocaine Pain Relief 4 % topical patch      metFORMIN (GLUCOPHAGE-XR) 500 mg extended release tablet metformin  mg tablet,extended release 24 hr      metoprolol tartrate (LOPRESSOR) 50 MG tablet Take 50 mg by mouth 2 times daily      oxyCODONE-acetaminophen (PERCOCET) 5-325 MG per tablet TAKE 1 TABLET BY MOUTH EVERY 6 HOURS AS NEEDED FOR PAIN FOR UP TO 3 DAYS. MAX 4 TABS DAILY      terazosin (HYTRIN) 5 mg capsule terazosin 5 mg capsule   Take 1 capsule every day by oral route for 30 days. terbinafine (LAMISIL) 1 % cream terbinafine HCl 1 % topical cream      terbinafine (LAMISIL) 250 MG tablet terbinafine HCl 250 mg tablet      triamcinolone (KENALOG) 0.1 % cream triamcinolone acetonide 0.1 % topical cream   APPLY A THIN LAYER TO THE AFFECTED AREA(S) BY TOPICAL ROUTE 2 TIMES PER DAY      venlafaxine (EFFEXOR XR) 150 MG extended release capsule venlafaxine  mg capsule,extended release 24 hr   Take 1 capsule twice a day by oral route for 30 days.            STOP taking these medications       baclofen (LIORESAL) 10 MG tablet Comments:   Reason for Stopping:         ketorolac (TORADOL) 10 MG tablet Comments:   Reason for Stopping:         Baclofen (LIORESAL) 5 MG tablet Comments:   Reason for Stopping:         docusate (COLACE, DULCOLAX) 100 MG CAPS Comments:   Reason for Stopping:         FLUoxetine (PROZAC) 20 MG capsule Comments:   Reason for Stopping:         hydroCHLOROthiazide (HYDRODIURIL) 12.5 MG tablet Comments:   Reason for Stopping:         HYDROcodone-acetaminophen ([facility]) 7.5-325 MG per tablet Comments:   Reason for Stopping:         hydrOXYzine (ATARAX) 25 MG tablet Comments:   Reason for Stopping:         lisinopril-hydroCHLOROthiazide (PRINZIDE;ZESTORETIC) 20-12.5 MG per tablet Comments:   Reason for Stopping: losartan-hydroCHLOROthiazide (HYZAAR) 50-12.5 MG per tablet Comments:   Reason for Stopping:         polyethylene glycol (GLYCOLAX) 17 GM/SCOOP powder Comments:   Reason for Stopping:         potassium chloride (KLOR-CON M) 20 MEQ extended release tablet Comments:   Reason for Stopping:         QUEtiapine (SEROQUEL) 25 MG tablet Comments:   Reason for Stopping:         senna (SENOKOT) 8.6 MG tablet Comments:   Reason for Stopping:         traMADol (ULTRAM) 50 MG tablet Comments:   Reason for Stopping:               Instructions:     PCP in 1-2 weeks, Speech therapy recommendation, Upright as possible for all oral intake;Small bites/sips; Check for pocketing of food on the Left. Outpatient physical therapy, diabetic diet, activity as tolerated, fall precautions. Time 35 min  Please send copy to primary physician.     Signed:  Chiki Vance MD  2/20/2023  11:46 AM

## 2023-02-20 NOTE — PLAN OF CARE
Problem: Discharge Planning  Goal: Discharge to home or other facility with appropriate resources  Outcome: Progressing     Problem: Pain  Goal: Verbalizes/displays adequate comfort level or baseline comfort level  Outcome: Progressing     Problem: Skin/Tissue Integrity  Goal: Absence of new skin breakdown  Description: 1. Monitor for areas of redness and/or skin breakdown  2. Assess vascular access sites hourly  3. Every 4-6 hours minimum:  Change oxygen saturation probe site  4. Every 4-6 hours:  If on nasal continuous positive airway pressure, respiratory therapy assess nares and determine need for appliance change or resting period.   Outcome: Progressing     Problem: Safety - Adult  Goal: Free from fall injury  Outcome: Progressing     Problem: ABCDS Injury Assessment  Goal: Absence of physical injury  Outcome: Progressing     Problem: Chronic Conditions and Co-morbidities  Goal: Patient's chronic conditions and co-morbidity symptoms are monitored and maintained or improved  Outcome: Progressing     Problem: Neurosensory - Adult  Goal: Achieves stable or improved neurological status  Outcome: Progressing  Flowsheets (Taken 2/19/2023 2000)  Achieves stable or improved neurological status: Assess for and report changes in neurological status  Goal: Achieves maximal functionality and self care  Outcome: Progressing     Problem: Cardiovascular - Adult  Goal: Maintains optimal cardiac output and hemodynamic stability  Outcome: Progressing  Flowsheets (Taken 2/19/2023 2000)  Maintains optimal cardiac output and hemodynamic stability: Monitor blood pressure and heart rate  Goal: Absence of cardiac dysrhythmias or at baseline  Outcome: Progressing  Flowsheets (Taken 2/19/2023 2000)  Absence of cardiac dysrhythmias or at baseline: Monitor cardiac rate and rhythm     Problem: Skin/Tissue Integrity - Adult  Goal: Skin integrity remains intact  Outcome: Progressing  Flowsheets (Taken 2/19/2023 2000)  Skin Integrity Remains Intact: Monitor for areas of redness and/or skin breakdown  Goal: Incisions, wounds, or drain sites healing without S/S of infection  Outcome: Progressing     Problem: Musculoskeletal - Adult  Goal: Return mobility to safest level of function  Outcome: Progressing  Goal: Maintain proper alignment of affected body part  Outcome: Progressing  Goal: Return ADL status to a safe level of function  Outcome: Progressing     Problem: Metabolic/Fluid and Electrolytes - Adult  Goal: Hemodynamic stability and optimal renal function maintained  Outcome: Progressing  Goal: Glucose maintained within prescribed range  Outcome: Progressing

## 2023-02-20 NOTE — PROGRESS NOTES
CG  called us to ask for Eliquis Rx  Dr Zoie Holder notified.  Rx sent to [facility]  To d/c by ambulance for home transport  PU 15:30

## 2023-02-20 NOTE — PROGRESS NOTES
GOALS: LTG: patient will tolerate safest, least restrictive oral diet without s/sx airway compromise  STG: Patient will tolerate easy to chew diet  diet and thin liquids without overt s/sx aspiration. Advanced 2/20  STG: Patient will tolerate ongoing po trials in efforts to advance diet. Progressing 2/20  STG: Patient will participate in modified barium swallow study to objectively assess oropharyngeal swallow if indicated    LTG: Patient will improve neuro-linguistic abilities to increase safety and awareness in functional living environment. STG: Patient will follow multi-step verbal commands with 90% accuracy with min-mod assistance. STG: Patient will identify appropriate solutions to problems with 90% accuracy given minimal cueing. STG: Patient will sequence steps to a task with 90% accuracy given min-mod cueing. STG: Patient will solve basic level mathematical problems with 90% accuracy given min-mod cueing. STG: Patient will complete reasoning tasks to improve problem solving and safety awareness with 80% accuracy given mod cueing. LTG: Patient will develop functional and intelligible speech and utilize compensatory strategies to improve communication across environments  STG: Patient will repeat words, phrases, sentences with 90% accuracy given minimal cueing  STG: Patient will utilize compensatory strategies across tasks with 80 accuracy given moderate cueing      SPEECH LANGUAGE PATHOLOGY: COMBINED Dysphagia AND Cognitive Comucation Initial Assessment and Daily Note #     MRN: [id number]    ADMISSION DATE: 2/16/2023  ADMITTING DIAGNOSIS: has Abnormal electrocardiogram (ECG) (EKG); Acute right MCA stroke ([address] Adjustment disorder with mixed anxiety and depressed mood; Atrial fibrillation ([address] Benign essential hypertension; Diabetes ([address] DIGGS (dyspnea on exertion); History of stroke with current residual effects; Gross hematuria;  Hemiparesis of left nondominant side as late effect of cerebral infarction [facility]); HIV (human immunodeficiency virus infection) ([facility] Hypercholesterolemia; Hypokalemia; Slurred speech; Dysarthria; and Acute CVA (cerebrovascular accident) ([facility] on their problem list.      ICD-10: Treatment Diagnosis: R13.11 Dysphagia, Oral Phase  R41.841 Cognitive-Communication Deficit  R47.1 Dysarthria and Anarthria    RECOMMENDATIONS   Diet Solids Recommendation: Easy to Chew  Liquid Consistency Recommendation: Thin  Recommended Form of Meds: Meds in puree     Additional Recommendations:   Recommendations: Dysphagia treatment;Assistance with meals  Compensatory Swallowing Strategies : Upright as possible for all oral intake;Small bites/sips; Check for pocketing of food on the Left  Therapeutic Interventions: Patient/Family education;Diet tolerance monitoring     Patient continues to require skilled intervention: Yes  D/C Recommendations: Ongoing speech therapy is recommended during this hospitalization, Ongoing speech therapy is recommended at next level of care     ASSESSMENT         Dysphagia Diagnosis: Mild oral stage dysphagia  More timely oral prep and clearing with solid textures this session. No overt s/sx airway compromise with solids or liquids.   -Recommend advance to easy to chew diet/thin liquids. Meds floated in puree/applesauce. Will continue to follow for po trials/potential diet advancement. Speech-Language and Cognitive Assessment:  Patient present with dysarthria and moderate cognitive linguistic deficits. Basic expressive/receptive language skills are intact. Poor accuracy with reasoning tasks such as answering complex yes/no questions, determining inconsistencies in sentences, and complementing basic math/time calculations. Mild difficulty also noted with immediate recall of multi-level commands. Dysarthria is characterized by decreased articulatory precision and blended word boundaries.  Speech is intelligible at the word and phrase level when within context; however, limited intelligibility at the sentence level in conversation. When repeating words/short sentences, speech intelligibility is much improved as patient is able to return demonstration of trained compensatory strategies. Patient currently function below baseline in regards to speech and cognitive linguistic abilities. Also continues to present with mild oral dysphagia. Recommend ongoing speech therapy during acute hospitalization and at next level of care. GENERAL   Subjective: Alert upright in bed for session. Reports disliking current diet. Speech remains dysarthric, but improved since [facility]. Behavior/Cognition: Alert; Cooperative;Pleasant mood  Communication Observation: Dysarthria  Follows Directions: Simple  Patient Positioning: Upright in bed  Respiratory Status: Room air     O2 Device: None (Room air)      Prior Dysphagia History: Per chart review, patient with history of oral dysphagia after CVA in January 2021. On a puree diet/thin liquids while at [facility]. Patient/family report patient advanced back to regular diet. Current Diet : Soft and Bite-Sized  Current Liquid Diet : Thin  Pain:   Patient does not c/o pain, Patient does not appear in pain    Pain Location: Head         Pain Level: 0                           Social/Functional History  Lives With: Alone  Type of Home: Senior housing apartment  Home Layout: One level  Receives Help From: Personal care attendant  Homemaking Assistance: Needs assistance  Active : No  Occupation: On disability    OBJECTIVE   Dysphagia: Patient consumed ~ 6 oz thin by straw, ~ 4 oz mixed fruit, and shefali cracker. Slowed, but functional oral prep of solid textures. Adequate oral clearing achieved with increased time. No overt s/sx airway compromise observed. Speech-Language and Cognitive Assessment.      Tests Given: Portion of the BRIEF COGNITIVE/COMMUNICATION EVALUATION:              Complex Y/N: 3/5       2-step Commands: 3/3       3-step Commands: 1/3 - only able to recall 2 of the 3 steps       Object Identification: 5/5       Automatic Speech Tasks: QUIQUE 100%; Counting 1-10- 100%        Word Repetition: 5/5- articulation errors  x2- cluster reduction noted with \"S\" blends       Phrase Repetition: 3/3       Sentence Repetition: 3/3        Simple reasoning- determining inconsistencies in sentences: 0/3       Problem Solving: 3/4       Math/Time Reasoning: Money calculations- 2/4; time calculations: 0/4      Diadochokinetic rate: Buttercup: repeated x7 in 5 seconds: FAIR range. Normal is 12-15 repetitions    PLAN    Duration/Frequency: Continue to follow patient 3x/week for duration of hospitalization and/or until goals met    Speech Therapy Prognosis  Prognosis: Excellent    Dysphagia Outcome and Severity Scale (FAN)  Dysphagia Outcome Severity Scale: Level 5: Mild dysphagia- Distant supervision. May need one diet consistency restricted  Interpretation of Tool: The Dysphagia Outcome and Severity Scale (FAN) is a simple, easy-to-use, 7-point scale developed to systematically rate the functional severity of dysphagia based on objective assessment and make recommendations for diet level, independence level, and type of nutrition. Normal(7), Functional(6), Mild(5), Mild-Moderate(4), Moderate(3), Moderate-Severe(2), Severe(1)    MODIFIED RAY SCALE (mRS) SCORE: 3  Interpretation of Tool: The Modified [place] Scale is a scale used to quantify level of disability as it relates to a patient's functional abilities. No Symptoms(0); No significant disability despite symptoms; able to carry out all usual duties and activities(1); Slight disability; unable to carry out all previous activities but able to look after own affairs without assistance(2); Moderate disability; requiring some help but able to walk without assistance(3); Moderately severe disability; unable to walk without assistance and unable to attend to own bodily needs without assistance(4);  Severe disability; bedridden, incontinent, and requiring constant nursing care and attention(5)    Education: Patient  Patient Education: SLP role, swallowing precautions, dysarthria strategies  Patient Education Response: Verbalizes understanding, Needs reinforcement    PRECAUTIONS/ALLERGIES: Amlodipine      Safety Devices in place: Yes  Type of devices: Left in bed, Call light within reach  Restraints Initially in Place: No    Therapy Time  SLP Individual Minutes  Time In: 0930  Time Out: [address]  Minutes: [address]  2/20/2023 10:19 AM

## 2023-02-20 NOTE — PROGRESS NOTES
ACUTE PHYSICAL THERAPY GOALS:   (Developed with and agreed upon by patient and/or caregiver.)  (1.)Mr. Zhao will move from supine to sit and sit to supine , scoot up and down, and roll side to side in bed with INDEPENDENT within 7 treatment day(s). (2.)Mr. Zhao will transfer from bed to chair and chair to bed with MOD I using the least restrictive device within 7 treatment day(s). (3.)Mr. Zhao will perform there ex B LE strengthening with SUPERVISION within 7 treatment days for increased AROM and strength. (4.)Mr. Zhao will perform static and dynamic standing balance activities x 10 min with CGA within 7 treatment days for increased balance with transfers. PHYSICAL THERAPY: Daily Note PM   (Link to Caseload Tracking: PT Visit Days : 2  Time In/Out PT Charge Capture  Rehab Caseload Tracker  Orders    Eliseo Mchugh Junior Zhao is a 59 y.o. male   PRIMARY DIAGNOSIS: <principal problem not specified>  Dysarthria [R47.1]  Slurred speech [R47.81]  Chest pain, unspecified type [R07.9]       Inpatient: [place] / [place] / Product Type: *No Product type* /     ASSESSMENT:     REHAB RECOMMENDATIONS:   Recommendation to date pending progress:  Setting:  Outpatient Therapy    Equipment:    None     ASSESSMENT:  Mr. Kenna Noe is making good progress toward goals. The patient demonstrated bed mobility with IND and several SPT with supervision. He does transfer with LE crossed and forward posture, pivoting on one LE. States this is how he transfers at home and while untraditional, it appears safe and stable. The patient again expressed interest in being able to ambulate again, and having feelings of depression. He would benefit from continued skilled PT in the Outpatient setting to increase functional mobility. SUBJECTIVE:   Mr. Kenna Noe states, \"I do get pretty depressed. \"     Social/Functional Lives With: Alone  Type of Home: Senior housing apartment (duplex)  Home Layout: One level  Home Access: Level entry  [address] [facility] [address]: 3-in-1 commode  Home Equipment: Wheelchair-electric  Receives Help From: Personal care attendant (CLTC aide)  ADL Assistance: [address]: Needs assistance  Ambulation Assistance: Non-ambulatory (power chair)  Transfer Assistance: Needs assistance  Active : No  Patient's  Info: CLTC aide or ATUL [place]  Occupation: On disability  OBJECTIVE:     PAIN: Everrett Lager / O2: PRECAUTION / [place] / Itzel [place]:   Pre Treatment: 0         Post Treatment: 0 Vitals        Oxygen    None    RESTRICTIONS/PRECAUTIONS:        MOBILITY: I Mod I S SBA CGA Min Mod Max Total  NT x2 Comments:   Bed Mobility    Rolling [] [] [] [] [] [] [] [] [] [x] []    Supine to Sit [x] [] [] [] [] [] [] [] [] [] []    Scooting [x] [] [] [] [] [] [] [] [] [] []    Sit to Supine [] [] [] [] [] [] [] [] [] [x] []    Transfers    Sit to Stand [] [] [x] [] [] [] [] [] [] [] []    Bed to Chair [] [] [x] [] [] [] [] [] [] [] []    Stand to Sit [] [] [x] [] [] [] [] [] [] [] []     [] [] [] [] [] [] [] [] [] [] []    I=Independent, Mod I=Modified Independent, S=Supervision, SBA=Standby Assistance, CGA=Contact Guard Assistance,   Min=Minimal Assistance, Mod=Moderate Assistance, Max=Maximal Assistance, Total=Total Assistance, NT=Not Tested    BALANCE: Good Fair+ Fair Fair- Poor NT Comments   Sitting Static [x] [] [] [] [] []    Sitting Dynamic [x] [] [] [] [] []              Standing Static [x] [x] [] [] [] []    Standing Dynamic [] [] [] [] [] [x]      GAIT: I Mod I S SBA CGA Min Mod Max Total  NT x2 Comments:   Level of Assistance [] [] [] [] [] [] [] [] [] [x] []    Distance   feet    DME N/A    Gait Quality N/A    Weightbearing Status      Stairs      I=Independent, Mod I=Modified Independent, S=Supervision, SBA=Standby Assistance, CGA=Contact Guard Assistance,   Min=Minimal Assistance, Mod=Moderate Assistance, Max=Maximal Assistance, Total=Total Assistance, NT=Not Tested    PLAN:   FREQUENCY AND DURATION: 3 times/week for duration of hospital stay or until stated goals are met, whichever comes first.    TREATMENT:   TREATMENT:   Co-Treatment PT/OT necessary due to patient's decreased overall endurance/tolerance levels, as well as need for high level skilled assistance to complete functional transfers/mobility and functional tasks  Therapeutic Activity (23 Minutes): Therapeutic activity included Supine to Sit, Scooting, Transfer Training, Sitting balance , and Standing balance to improve functional Activity tolerance, Balance, Mobility, and Strength.     TREATMENT GRID:  N/A    AFTER TREATMENT PRECAUTIONS: Alarm Activated, Bed/Chair Locked, Call light within reach, Chair, Needs within reach, and RN notified    INTERDISCIPLINARY COLLABORATION:  RN/ PCT, PT/ PTA, and OT/ STATON    EDUCATION:      TIME IN/OUT:  Time In: 1304  Time Out: [address]  Minutes: [address], PTA

## 2023-02-20 NOTE — CARE COORDINATION
Pt is medically cleared for dc to home today with outpatient PT/OT/SLP services through [facility] services. Pt's CLTC aide can not transport pt home today. Ambulance transport scheduled for  through [facility]. No other dc needs or concerns identified or reported. 02/20/23 1341   Service Assessment   Patient Orientation Alert and Oriented   Cognition Alert   History Provided By Patient;Medical Record   Primary Caregiver Other (Comment)  (CLTC aide)   Support Systems Family Members;Home Care Staff   PCP Verified by CM Yes   Last Visit to PCP Within last 6 months   Prior Functional Level Assistance with the following:;Bathing;Cooking;Housework; Shopping;Mobility   Current Functional Level Assistance with the following:;Bathing;Cooking;Housework; Shopping;Mobility   Can patient return to prior living arrangement Yes   Ability to make needs known: Good   Family able to assist with home care needs: Yes   Would you like for me to discuss the discharge plan with any other family members/significant others, and if so, who? No   Financial Resources  Resources Other (Comment)  (CLTC)   Social/Functional History   Lives With Alone   Type of Home Senior housing apartment  (duplex)   Home Layout One level   Home Access Level entry   [facility] care attendant  (CLTC aide)   [address] Needs assistance   Ambulation Assistance Non-ambulatory  (power chair)   Transfer Assistance Needs assistance   Active  No   Patient's  Info CLTC aide or [facility] aby   Occupation On disability   Discharge Planning   Type of [address] Prior To Admission [address] PT/OT   DME Ordered?  No   Potential Assistance Purchasing Medications No   Type of [address] [facility]   Patient expects to be discharged to: Apartment   One/Two Story Residence One story   Services At/After Discharge   Transition of Care Consult (CM Consult) [address] Discharge Outpatient; In ambulance;Transport;PT;OT;SLP   Rich Provided? No   Mode of Transport at Discharge [address] [facility] Time of Discharge   ([address] [place] [address])   Confirm Follow Up Transport Other (see comment)  (CLTC aide or ATUL Clements)   Condition of Participation: Discharge Planning   The Plan for Transition of Care is related to the following treatment goals: Outpatient PT/OT/SLP services to improve pt's strength, mobility and functional abilities   The Patient and/or Patient Representative was provided with a Choice of Provider? Patient   The Patient and/Or Patient Representative agree with the Discharge Plan? Yes   Freedom of Choice list was provided with basic dialogue that supports the patient's individualized plan of care/goals, treatment preferences, and shares the quality data associated with the providers?   Yes

## 2023-02-20 NOTE — PROGRESS NOTES
ACUTE OCCUPATIONAL THERAPY GOALS:   (Developed with and agreed upon by patient and/or caregiver.)  1. Patient will complete lower body bathing and dressing with Mod I and adaptive equipment as   needed. 2. Patient will completed upper body bathing and dressing with Mod I and adaptive equipment as needed. 3. Patient will complete grooming seated at sink with Mod I and adaptive equipment as needed. 4. Patient will complete toileting with Mod I and adaptive equipment as needed. 5. Patient will tolerate 30 minutes of OT treatment with no rest breaks to increase activity tolerance for ADLs. 6. Patient will complete functional transfers with Mod I and adaptive equipment as needed. Timeframe: 7 visits     OCCUPATIONAL THERAPY: Daily Note PM   OT Visit Days: 2   Time In/Out  OT Charge Capture  Rehab Caseload Tracker  OT Orders    aSlud Estradaloraine Junior Zhao is a 59 y.o. male   PRIMARY DIAGNOSIS: <principal problem not specified>  Dysarthria [R47.1]  Slurred speech [R47.81]  Chest pain, unspecified type [R07.9]       Inpatient: [place] / [place] / Product Type: *No Product type* /     ASSESSMENT:     REHAB RECOMMENDATIONS: CURRENT LEVEL OF FUNCTION:  (Most Recently Demonstrated)   Recommendation to date pending progress:  Setting:  Outpatient Therapy    Equipment:    To Be Determined Bathing:  Not Tested  Dressing:  Minimal Assist  Feeding/Grooming:  Supervision/Setup  Toileting:  Not Tested  Functional Mobility:  Stand by Assist SPT     ASSESSMENT:  Mr. Yohannes Watkins is a 60 y/o M presenting with slurred speech resulting in acute cerebellar stroke. Applicable PMHx: R CVA with residual L hemiplegia, HTN, HIV, anxiety/depression. Pt received sitting EOB, agreeable to therapy. Pt overall SBA SPT bed>chair then wheeled up to sink to complete grooming ADLs with set up. Pt then min A for UE dressing, attempted education on natasha dressing technique, pt declined.  Pt then practiced BSC transfer and was SBA for SPT chair<>BSC. Pt given HEP for BUE/LE exercises and pt ready for d/c. Pt has progressed well toward goals this admission for therapy. Continue POC.         SUBJECTIVE:     Mr. Mica Valadez states, \"I do pretty well\"     Social/Functional Lives With: Alone  Type of Home: Senior housing apartment (duplex)  Home Layout: One level  Home Access: Level entry  Bathroom Equipment: 3-in-1 commode  Home Equipment: Clemon Led Help From: Personal care attendant (CLTC aide)  ADL Assistance: [address]: Needs assistance  Ambulation Assistance: Non-ambulatory (power chair)  Transfer Assistance: Needs assistance  Active : No  Patient's  Info: CLTC aide or ATUL Franky Angry  Occupation: On disability    OBJECTIVE:     Darryl Garcia / Colleen Snider / AIRWAY: None    RESTRICTIONS/PRECAUTIONS:  Restrictions/Precautions  Restrictions/Precautions: Fall Risk        PAIN: VITALS / O2:   Pre Treatment:   Pain Assessment: None - Denies Pain        Post Treatment: no c/o pain, resting comfortably in bedside chair Vitals          Oxygen        MOBILITY: I Mod I S SBA CGA Min Mod Max Total  NT x2 Comments: pt received sitting EOB   Bed Mobility    Rolling [] [] [] [] [] [] [] [] [] [x] []    Supine to Sit [] [] [] [] [] [] [] [] [] [x] []    Scooting [] [] [] [] [] [] [] [] [] [x] []    Sit to Supine [] [] [] [] [] [] [] [] [] [x] []    Transfers    Sit to Stand [] [] [] [x] [] [] [] [] [] [] [] SPT   Bed to Chair [] [] [] [x] [] [] [] [] [] [] [] SPT   Stand to Sit [] [] [] [x] [] [] [] [] [] [] [] SPT   Tub/Shower [] [] [] [] [] [] [] [] [] [x] []     Toilet [] [] [] [x] [] [] [] [] [] [] []  SPT to [facility]    [] [] [] [] [] [] [] [] [] [x] []    I=Independent, Mod I=Modified Independent, S=Supervision/Setup, SBA=Standby Assistance, CGA=Contact Guard Assistance, Min=Minimal Assistance, Mod=Moderate Assistance, Max=Maximal Assistance, Total=Total Assistance, NT=Not Tested    ACTIVITIES OF DAILY LIVING: I Mod I S SBA CGA Min Mod Max Total NT Comments   BASIC ADLs:              Upper Body   Bathing [] [] [] [] [] [] [] [] [] [x]    Lower Body Bathing [] [] [] [] [] [] [] [] [] [x]    Toileting [] [] [] [] [] [] [] [] [] [x]    Upper Body Dressing [] [] [] [] [] [x] [] [] [] [] [place] gown and donning pullover shirt seated in chair   Lower Body Dressing [] [] [] [] [] [] [] [] [] [x]    Feeding [] [] [] [] [] [] [] [] [] [x]    Grooming [] [] [x] [] [] [] [] [] [] [] Set up brushing teeth sitting at sink   Personal Device Care [] [] [] [] [] [] [] [] [] [x]    Functional Mobility [] [] [] [x] [] [] [] [] [] [] SPT   I=Independent, Mod I=Modified Independent, S=Supervision/Setup, SBA=Standby Assistance, CGA=Contact Guard Assistance, Min=Minimal Assistance, Mod=Moderate Assistance, Max=Maximal Assistance, Total=Total Assistance, NT=Not Tested    BALANCE: Good Fair+ Fair Fair- Poor NT Comments   Sitting Static [x] [] [] [] [] []    Sitting Dynamic [x] [] [] [] [] []              Standing Static [] [x] [] [] [] []    Standing Dynamic [] [x] [] [] [] []        PLAN:     FREQUENCY/DURATION   OT Plan of Care: 3 times/week for duration of hospital stay or until stated goals are met, whichever comes first.    TREATMENT:     TREATMENT:   Neuromuscular Re-education (13 Minutes): Patient participated in neuromuscular re-education including weight shifting, postural training, standing tolerance activity , and sitting balance activity   with minimal tactile cues to improve sitting balance, standing balance, proprioception, posture, coordination, static balance, and dynamic balance in order to prepare for functional task, prepare for seated ADLs, prepare for standing ADLs, prepare for functional transfer, prepare for discharge home , and prepare for self care. .   Self Care (10 minutes): Patient participated in toileting, upper body dressing, and grooming ADLs in unsupported sitting and standing with minimal tactile cueing to increase independence, decrease assistance required, and increase activity tolerance. Patient also participated in functional mobility and functional transfer training to increase independence, decrease assistance required, increase activity tolerance, and increase safety awareness.      TREATMENT GRID:  N/A    AFTER TREATMENT PRECAUTIONS: Alarm Activated, Call light within reach, Chair, Needs within reach, and RN notified    INTERDISCIPLINARY COLLABORATION:  RN/ PCT, PT/ PTA, and OT/ STATON    EDUCATION:   Exercise HEP    TOTAL TREATMENT DURATION AND TIME:  Time In: [address]  Time Out: [address]  Minutes: [address]

## 2023-02-21 ENCOUNTER — TELEPHONE (OUTPATIENT)
Dept: NEUROLOGY | Age: 58
End: 2023-02-21

## 2023-02-22 ENCOUNTER — TELEPHONE (OUTPATIENT)
Dept: NEUROLOGY | Age: 58
End: 2023-02-22

## 2023-02-22 NOTE — TELEPHONE ENCOUNTER
Care Transitions Initial Follow Up Call    Outreach made within 2 business days of discharge: Yes    Patient: Leandra Giang Patient : 1965   MRN: [id number]  Reason for Admission: STROKE  Discharge Date: 23       Spoke with: Miguel Cantu CAREGIVER    Discharge department/facility: [address] [place] [address] Interactive Patient Contact:  Was patient able to fill all prescriptions: Yes  Was patient instructed to bring all medications to the follow-up visit: No:   Is patient taking all medications as directed in the discharge summary?  Yes  Does patient understand their discharge instructions: Yes  Does patient have questions or concerns that need addressed prior to 7-14 day follow up office visit: no    Scheduled appointment with PCP within 7-14 days    Follow Up  Future Appointments   Date Time Provider Johan Telles   3/3/2023 10:00 AM EMILI BraswellL MESSI Mckenzie

## 2023-03-02 PROBLEM — E11.9 TYPE 2 DIABETES MELLITUS WITHOUT COMPLICATION (HCC): Status: ACTIVE | Noted: 2022-08-08

## 2023-03-02 PROBLEM — E66.9 OBESITY WITH BODY MASS INDEX 30 OR GREATER: Status: ACTIVE | Noted: 2022-08-08

## 2023-03-02 PROBLEM — R80.9 MICROALBUMINURIA: Status: ACTIVE | Noted: 2022-10-31

## 2023-03-02 PROBLEM — F41.1 GENERALIZED ANXIETY DISORDER: Status: ACTIVE | Noted: 2022-10-28

## 2023-03-02 NOTE — PROGRESS NOTES
763 [facility]  11 [facility]  727 [facility]  [address] [place], [address] [facility]      Chief Complaint   Patient presents with    Follow-Up from Hospital     Stroke        Romeo Geiger is a 59 y.o. male who presents hospital follow up for cerebellar stroke. PMH significant for RMCA stroke s/p YENI, pAF, HIV, HTN, DM type II, and depression/anxiety who presented to  ED on 2/16 with severe dysarthria and has an acute cerebellar stroke. This occurred while the patient was off Eliquis due to being out of the medication. Code S, NIH 9 (baseline 7). CT of head negative for acute abnormality. CTA of head/neck negative for LVO or high grade stenosis. He was not candidate for TNK or YENI. MRI of brain showed right cerebellar infarct; chronic remote right basal ganglia/corona radiata. A.flutter on EKG. He was evaluated by PT/OT/ST and physiatry with recommendations for OP therapies. He was discharges on atorvastatin 80 mg daily for secondary stroke prevention, recommendation to resume Eliquis will be resumed on Sunday morning 2/19. Interval history:  He is here today with his caretaker. Endorses intermittent vertigo and nausea when changing positions, resolves after a few minutes. Chronic left hemiplegia and dysarthria from previous stroke. He is not currently participating in therapies. He requires max assistance with ADLs. He is currently on Eliquis 5 mg twice daily and atorvastatin 80 mg daily for secondary stroke prevention. Per caretaker, he was not currently taking anxiety, HIV or DM medications. Stated he was only discharged on 3 medications. However, reviewing chart and DC summary, these medications were to be continued. Discussed with caretaker, she is going to have him follow up with PCP at [facility] and requesting transfer of care to [facility] for pAF. He endorses snoring and increased daytime somnolence. Stated he has difficulty maintain sleep.  No prior hx of sleep apnea or sleep study. Denies recreational drug use, tobacco use, or ETOH. Past Medical History:   Diagnosis Date    Stroke [facility])        No past surgical history on file. No family history on file. Social History     Socioeconomic History    Marital status: Single   Tobacco Use    Smoking status: Never    Smokeless tobacco: Never   Substance and Sexual Activity    Alcohol use: Not Currently    Drug use: Never         Current Outpatient Medications:     acetaminophen (TYLENOL) 325 MG tablet, Take 650 mg by mouth every 4 hours as needed, Disp: , Rfl:     bictegravir-emtricitab-tenofovir alafenamide (BIKTARVY) -25 MG TABS per tablet, Take 1 tablet by mouth daily, Disp: , Rfl:     lidocaine 4 % external patch, Place 1 patch onto the skin daily, Disp: , Rfl:     lisinopril (PRINIVIL;ZESTRIL) 20 MG tablet, Take 20 mg by mouth daily, Disp: , Rfl:     metFORMIN (GLUCOPHAGE-XR) 500 MG extended release tablet, Take 500 mg by mouth, Disp: , Rfl:     metoprolol tartrate (LOPRESSOR) 50 MG tablet, Take 50 mg by mouth 2 times daily, Disp: , Rfl:     oxyCODONE-acetaminophen (PERCOCET) 5-325 MG per tablet, Take 1 tablet by mouth every 6 hours as needed for Pain., Disp: , Rfl:     terazosin (HYTRIN) 5 MG capsule, Take 5 mg by mouth daily, Disp: , Rfl:     terbinafine (LAMISIL) 1 % cream, Apply topically Apply topically 2 times daily. , Disp: , Rfl:     terbinafine (LAMISIL) 250 MG tablet, Take 250 mg by mouth daily, Disp: , Rfl:     triamcinolone (KENALOG) 0.1 % cream, Apply topically . , Disp: , Rfl:     venlafaxine (EFFEXOR XR) 150 MG extended release capsule, Take 1 capsule by mouth daily, Disp: 30 capsule, Rfl: 2    meclizine (ANTIVERT) 12.5 MG tablet, Take 1 tablet by mouth 3 times daily as needed for Dizziness, Disp: 30 tablet, Rfl: 1    atorvastatin (LIPITOR) 80 MG tablet, Take 1 tablet by mouth nightly, Disp: 90 tablet, Rfl: 0    apixaban (ELIQUIS) 5 MG TABS tablet, Take 1 tablet by mouth 2 times daily, Disp: 180 tablet, Rfl: 0    Handicap Placard [facility], Supply prescription to [facility] with completed form [id number]., Disp: , Rfl:     Allergies   Allergen Reactions    Amlodipine Shortness Of Breath       Review of Systems   Constitutional: Negative. HENT: Negative. Eyes: Negative. Respiratory: Negative. Cardiovascular:  Positive for palpitations. Gastrointestinal:  Positive for nausea. Endocrine: Negative. Genitourinary: Negative. Musculoskeletal:  Positive for gait problem. Skin: Negative. Allergic/Immunologic: Positive for immunocompromised state (HIV). Neurological:  Positive for dizziness, speech difficulty (dysarthria), weakness (spastic hemiplegia on left (chronic)) and numbness. Psychiatric/Behavioral:  Positive for sleep disturbance (insomnia). Negative for suicidal ideas. The patient is nervous/anxious. Physical Examination  BP (!) 166/104   Pulse 80   Ht 5' 9\" (1.753 m)   Wt 200 lb (90.7 kg)   SpO2 98%   BMI 29.53 kg/m²     General - Well developed, well nourished, in no apparent distress. Pleasant and conversent. HEENT - Normocephalic, atraumatic. Conjunctiva, tympanic membranes, and oropharynx are clear. Neck - Supple without masses, no bruits   Cardiovascular - irregular rate and rhythm. Normal S1, S2 without murmurs, rubs, or gallops. Lungs - Clear to auscultation. Abdomen - Soft, nontender with normal bowel sounds. Extremities - Peripheral pulses intact. No edema and no rashes. Neurological examination - Comprehension, attention , memory and reasoning are intact. Language and speech dysarthric, scanning speech. On cranial nerve examination pupils are equal round and reactive to light. Fundoscopic examination is normal. Visual acuity is adequate. Visual fields are full to finger confrontation. Extraocular motility is normal. Face is symmetric and sensation is intact to light touch. Hearing is intact to finger rustle bilaterally.  Motor examination - There is increased muscle tone and bulk on left. Power is full throughout on right, 3/5 LUE with contracture, 3/5 LLE. Muscle stretch reflexes are 1+ BUE, areflexic patellar and ankle jerks. Sensation is intact to light touch, pinprick, vibration and proprioception in all extremities. Cerebellar examination is normal finger/nose on right, unable to assess left. Gait and stance  not assessed due to fall risk, currently in wheelchair. Most recent CTA  - I personally reviewed this image   CT Result (most recent): HISTORY: 59years of age Male with slurred speech and weakness. History of  stroke. EXAM: CTA HEAD NECK W CONTRAST. Radiation reduction dose techniques were used  for the study. Our CT scanner use one or all of the following- Automated  exposure control, adjustment of the mA and/or KV according the patient size,  iterative reconstruction. 3-D multiplanar reformations were performed at the  workstation. All carotid artery stenosis percentages are based upon NASCET  criteria if appropriate. Contrast: 100 ml of Isovue-370 intravenous contrast was  administered. Per the CT technologist, the study was analyzed by the [address]  algorithm. The radiologists have been asked by the hospital administration to  designate when CTA studies are sent to [address] even though this algorithm is not  used by the radiologist for exam interpretation. COMPARISON: No prior vascular imaging of the head or neck available. FINDINGS:   VASCULAR FINDINGS:  Cervical CTA:  Visualized Aorta: There is a three-vessel branching pattern of the aortic arch. No significant atherosclerotic disease of the aortic arch. Right Subclavian Artery: Grossly patent. Left Subclavian Artery: Grossly Patent. Right Carotid Arteries:   Common Carotid Artery: Patent without significant stenosis. External Carotid  Artery: Grossly patent. Carotid Bulb and Proximal ICA: No significant atherosclerotic disease or  stenosis.   Cervical Right ICA: Patent. Vertebral Artery Dominance: Codominant. Cervical Right Vertebral Artery: Patent. Left Carotid Arteries:   Common Carotid Artery: Patent without significant stenosis. External Carotid  Artery: Grossly patent. Carotid Bulb and Proximal ICA: No significant atherosclerotic disease or  stenosis. Cervical Left ICA: Patent. Cervical Left Vertebral Artery: Patent. Cranial CTA:  Right Internal Carotid Artery: Patent. Left Internal Carotid Artery: Patent. Intracranial ICA Atherosclerotic Disease: There is no significant  atherosclerotic disease of the bilateral ICAs. Anterior Cerebral Arteries:  Right A1 Segment: Patent. Left A1: Patent. Bilateral A2 and A3 segments appear grossly patent. Right Middle Cerebral Arteries:   Right M1 segment is patent. Major proximal MCA branches beyond the bifurcation  are patent. Left Middle Cerebral Arteries:   Left M1 segment is patent. Major proximal MCA branches beyond the bifurcation  are patent. Right Vertebral Artery: Patent. Left Vertebral Artery: Patent. Basilar Artery: Patent. Right Posterior Cerebral Artery: Patent. Left Posterior Cerebral Artery: Patent. Intracranial Aneurysms: None visualized. .   Intracranial Proximal Vessel Occlusion: None. Major Dural Venous Sinuses: Grossly patent where adequately visualized. NONVASCULAR FINDINGS:  Head:  Brain Parenchyma: Findings of old right basal ganglia and insular infarct. No  evidence of intracranial hemorrhage. Orbital Structures: Grossly unremarkable. Calvarial/Maxillofacial Soft Tissues: No evidence of significant acute  abnormality in the calvarial or maxillofacial soft tissues. Neck:  Thyroid gland:Subcentimeter hypodense nodule in the right thyroid lobe. Cervical and Upper Mediastinal Adenopathy: No significant adenopathy. Upper Lungs: Without areas of prominent focal consolidation or masses.       OSSEOUS STRUCTURES:  Mastoid Air Cells: Unremarkable. Paranasal Sinuses: No evidence of significant mucoperiosteal thickening. Cervical Spine: Mild degenerative changes of the mid to lower cervical spine. Impression  1. Negative CTA exam of the major cervical arterial vasculature for significant  stenosis or occlusion. 2. Negative CTA exam of the major intracranial arterial vasculature for  significant proximal vessel stenosis, occlusion, or aneurysms. 3. Findings of old right basal ganglia and right insular infarct. CT BRAIN PERFUSION 02/16/2023    Narrative  Exam: CT BRAIN PERFUSION on 2/16/2023 12:25 PM    Clinical History: The Male patient is 59years old  presenting for Dysarthria. COMPARISON: Head CT 2/16/2023    TECHNIQUE: CT perfusion of the brain was obtained after the administration of  intravenous contrast.  Perfusion maps and perfusion analysis output were  generated using the [employer] Cont[employer] perfusion processing software algorithm for LVO  detection. Radiation dose reduction techniques were used for this study: All CT scans  performed at this facility use one or all of the following: Automated exposure  control, adjustment of the mA and/or kVp according to patient's size, iterative  reconstruction. Total radiation dose: 2139 mGy-cm    FINDINGS: Study is technically adequate. Adequate vascular enhancement is  demonstrated. RAPID Output Values:    CBF < 30% volume (best correlation with core infarct volume without overcalls):  0 ml (core infarction volume greater than 50 cc associated with poor outcomes)    Tmax > 6 seconds: 28 ml    Tmax/CBF Mismatch Volume: 28 ml    Tmax/CBF Mismatch Ratio: None    Hypoperfusion Intensity Ratio (Tmax > 10 seconds / Tmax > 6 seconds): 0 (values  greater than 0.5 associated with poor outcome)    Tmax > 10 seconds Volume: 0 ml (volume greater than 100 mL is associated with  poor outcome)    Impression  1. No evidence of core CT cerebral perfusion defect.   2. Hypoperfusion changes involving the right cerebellum and occipital lobe. Please note that the determination of patient treatment is not based solely upon  imaging factors or calculation values. Management of ischemia is at the  discretion of the primary physician and is based upon a combination of clinical  and imaging data, along other factors. Most recent MRI - I personally reviewed this image   MRI Result (most recent):  MRI BRAIN WO CONTRAST 02/16/2023    Narrative  MRI brain without contrast:    History: Acute onset dysarthria    Imaging sequences: Multiplanar multisequence imaging was performed on a 1.5  Skylar magnet. Comparison: None. Correlation is made to the CT scan of the brain and CT  perfusion study performed earlier on the same day. Findings: There are remote infarctions within the right basal ganglia/corona  radiata. There is associated hemosiderin staining There is mild ex vacuo  dilatation of the body right lateral ventricle. The ventricles are otherwise  normal in size and configuration. There is asymmetric prominence to the extra-axial space within the right middle  cranial fossa suspicious for an arachnoid cyst measuring up to 1.9 cm. There are  no additional extra-axial fluid collections. Normal flow voids are present  within all of the major intracranial vessels. No evidence of acute  intraparenchymal hemorrhage or mass effect is identified. There is an acute infarction within the right cerebellar hemisphere within the  superior cerebellar artery distribution. Patchy and discrete foci of T2 prolongation are present within the  supratentorial white matter. These are nonspecific findings but would be most  compatible with moderate chronic small vessel ischemic changes. There are a few opacified right mastoid air cells. There is a lipoma within the  midline occipital soft tissues measuring up to 2.9 cm. Impression  1. Acute right cerebellar infarction.   2. Moderate chronic small vessel ischemic change. 3. Remote right basal ganglia/corona radiata lacunar infarction. Lab Results   Component Value Date    CHOL 134 02/17/2023     Lab Results   Component Value Date    TRIG 59 02/17/2023     Lab Results   Component Value Date    HDL 35 (L) 02/17/2023     Lab Results   Component Value Date    LDLCALC 87.2 02/17/2023     Lab Results   Component Value Date    LABVLDL 11.8 02/17/2023     Lab Results   Component Value Date    CHOLHDLRATIO 3.8 02/17/2023     Hemoglobin A1C   Date Value Ref Range Status   02/17/2023 6.3 (H) 4.8 - 5.6 % Final           Humble Jackson was seen today for follow-up from hospital.    Diagnoses and all orders for this visit:    Hospital discharge follow-up  MA DISCHARGE MEDS RECONCILED W/ CURRENT OUTPATIENT MED LIST   Cerebellar stroke ([facility] [address]  Continue secondary stroke prevention Eliquis 5 mg twice daily and atorvastatin  Goal SBP <130/80  Goal LDL<70  Goal A1C <7.0  Will refer to sleep medicine to rule out ETHEL given recent stroke, pAF and hx of snoring. Discussed Mediterranean diet and increasing cardiovascular exercise to goal of 30 minutes daily. Depression screening completed. Reviewed BE FAST and when to call 911.     -     [address]    Paroxysmal atrial fibrillation ([facility] [address]  Stable. Continue Eliquis. Will send referral to [facility] per request for transfer of care from [facility]. -     [address]    Type 2 diabetes mellitus with hyperglycemia, without long-term current use of insulin (HCC)  Stable. HTN, goal below 130/80    Hemiparesis of left nondominant side as late effect of cerebral infarction [facility])  Will refer to PT/OT  Will send referral to [facility] for consideration of botox to help with spastic hemiplegia. Start baclofen 5 mg three times daily. 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  Medication and side effects discussed. Exercises provided. -     meclizine (ANTIVERT) 12.5 MG tablet; Take 1 tablet by mouth 3 times daily as needed for Dizziness    Dysarthria  Secondary to #1. Anxiety  Will resume effexor. Will need to follow up with PCP. -     venlafaxine (EFFEXOR XR) 150 MG extended release capsule; Take 1 capsule by mouth daily    Suspected sleep apnea  Will send referral to sleep medicine for evaluation and treatment.   -     [address] Sleep Medicine, [facility]    Asymptomatic HIV infection, with no history of HIV-related illness [facility])  Discussed with caretaker, she is going to have him follow up with PCP at [facility]     Follow up in 3 months or sooner if needed    I spent greater than 50% of the 60 total minutes of today's visit in coordination of care and patient/family education and counseling regarding the above patient concerns, reviewing the patient's medical record, my assessment and recommendations.         Maegan Mcgowan, [address] Neurology [address] [place] [address] 90, 268 [facility], [address] [facility]  PIYYW:[phone]

## 2023-03-03 ENCOUNTER — OFFICE VISIT (OUTPATIENT)
Dept: NEUROLOGY | Age: 58
End: 2023-03-03

## 2023-03-03 VITALS
SYSTOLIC BLOOD PRESSURE: 166 MMHG | WEIGHT: 200 LBS | DIASTOLIC BLOOD PRESSURE: 104 MMHG | BODY MASS INDEX: 29.62 KG/M2 | HEIGHT: 69 IN | HEART RATE: 80 BPM | OXYGEN SATURATION: 98 %

## 2023-03-03 DIAGNOSIS — R29.818 SUSPECTED SLEEP APNEA: ICD-10-CM

## 2023-03-03 DIAGNOSIS — R42 VERTIGO AS LATE EFFECT OF CEREBROVASCULAR ACCIDENT (CVA): ICD-10-CM

## 2023-03-03 DIAGNOSIS — I69.398 VERTIGO AS LATE EFFECT OF CEREBROVASCULAR ACCIDENT (CVA): ICD-10-CM

## 2023-03-03 DIAGNOSIS — Z21 ASYMPTOMATIC HIV INFECTION, WITH NO HISTORY OF HIV-RELATED ILLNESS (HCC): ICD-10-CM

## 2023-03-03 DIAGNOSIS — I10 HTN, GOAL BELOW 130/80: ICD-10-CM

## 2023-03-03 DIAGNOSIS — E11.65 TYPE 2 DIABETES MELLITUS WITH HYPERGLYCEMIA, WITHOUT LONG-TERM CURRENT USE OF INSULIN (HCC): ICD-10-CM

## 2023-03-03 DIAGNOSIS — I69.354 SPASTIC HEMIPLEGIA OF LEFT NONDOMINANT SIDE AS LATE EFFECT OF CEREBRAL INFARCTION (HCC): ICD-10-CM

## 2023-03-03 DIAGNOSIS — R47.1 DYSARTHRIA: ICD-10-CM

## 2023-03-03 DIAGNOSIS — F41.9 ANXIETY: ICD-10-CM

## 2023-03-03 DIAGNOSIS — I63.9 CEREBELLAR STROKE (HCC): ICD-10-CM

## 2023-03-03 DIAGNOSIS — Z09 HOSPITAL DISCHARGE FOLLOW-UP: Primary | ICD-10-CM

## 2023-03-03 DIAGNOSIS — I48.0 PAROXYSMAL ATRIAL FIBRILLATION (HCC): ICD-10-CM

## 2023-03-03 RX ORDER — BACLOFEN 5 MG/1
5 TABLET ORAL 3 TIMES DAILY
Qty: 90 TABLET | Refills: 2 | Status: SHIPPED | OUTPATIENT
Start: 2023-03-03

## 2023-03-03 RX ORDER — MECLIZINE HCL 12.5 MG/1
12.5 TABLET ORAL 3 TIMES DAILY PRN
Qty: 30 TABLET | Refills: 1 | Status: SHIPPED | OUTPATIENT
Start: 2023-03-03 | End: 2023-03-23

## 2023-03-03 RX ORDER — VENLAFAXINE HYDROCHLORIDE 150 MG/1
150 CAPSULE, EXTENDED RELEASE ORAL DAILY
Qty: 30 CAPSULE | Refills: 2 | Status: SHIPPED | OUTPATIENT
Start: 2023-03-03

## 2023-03-03 RX ORDER — TERAZOSIN 5 MG/1
5 CAPSULE ORAL DAILY
COMMUNITY

## 2023-03-03 RX ORDER — ACETAMINOPHEN 325 MG/1
650 TABLET ORAL EVERY 4 HOURS PRN
COMMUNITY

## 2023-03-03 RX ORDER — PRENATAL VIT 91/IRON/FOLIC/DHA 28-975-200
COMBINATION PACKAGE (EA) ORAL
COMMUNITY

## 2023-03-03 RX ORDER — LIDOCAINE 4 G/G
1 PATCH TOPICAL DAILY
COMMUNITY

## 2023-03-03 RX ORDER — VENLAFAXINE HYDROCHLORIDE 150 MG/1
150 CAPSULE, EXTENDED RELEASE ORAL DAILY
COMMUNITY
End: 2023-03-03 | Stop reason: SDUPTHER

## 2023-03-03 RX ORDER — METFORMIN HYDROCHLORIDE 500 MG/1
500 TABLET, EXTENDED RELEASE ORAL
COMMUNITY

## 2023-03-03 RX ORDER — METOPROLOL TARTRATE 50 MG/1
50 TABLET, FILM COATED ORAL 2 TIMES DAILY
COMMUNITY

## 2023-03-03 RX ORDER — OXYCODONE HYDROCHLORIDE AND ACETAMINOPHEN 5; 325 MG/1; MG/1
1 TABLET ORAL EVERY 6 HOURS PRN
COMMUNITY

## 2023-03-03 RX ORDER — AMLODIPINE BESYLATE 10 MG/1
10 TABLET ORAL DAILY
COMMUNITY
End: 2023-03-03

## 2023-03-03 RX ORDER — LISINOPRIL 20 MG/1
20 TABLET ORAL DAILY
COMMUNITY

## 2023-03-03 RX ORDER — TRIAMCINOLONE ACETONIDE 1 MG/G
CREAM TOPICAL
COMMUNITY

## 2023-03-03 RX ORDER — BICTEGRAVIR SODIUM, EMTRICITABINE, AND TENOFOVIR ALAFENAMIDE FUMARATE 50; 200; 25 MG/1; MG/1; MG/1
1 TABLET ORAL DAILY
COMMUNITY

## 2023-03-03 RX ORDER — TERBINAFINE HYDROCHLORIDE 250 MG/1
250 TABLET ORAL DAILY
COMMUNITY

## 2023-03-03 ASSESSMENT — ANXIETY QUESTIONNAIRES
IF YOU CHECKED OFF ANY PROBLEMS ON THIS QUESTIONNAIRE, HOW DIFFICULT HAVE THESE PROBLEMS MADE IT FOR YOU TO DO YOUR WORK, TAKE CARE OF THINGS AT HOME, OR GET ALONG WITH OTHER PEOPLE: SOMEWHAT DIFFICULT
3. WORRYING TOO MUCH ABOUT DIFFERENT THINGS: 1
5. BEING SO RESTLESS THAT IT IS HARD TO SIT STILL: 2
4. TROUBLE RELAXING: 2
6. BECOMING EASILY ANNOYED OR IRRITABLE: 1
7. FEELING AFRAID AS IF SOMETHING AWFUL MIGHT HAPPEN: 1
2. NOT BEING ABLE TO STOP OR CONTROL WORRYING: 1

## 2023-03-03 ASSESSMENT — ENCOUNTER SYMPTOMS
RESPIRATORY NEGATIVE: 1
EYES NEGATIVE: 1
NAUSEA: 1

## 2023-03-07 NOTE — THERAPY EVALUATION
Arjun Zhao  : 1965  [place]  Secondary:  Melina Méndez  [address] [address]  Phone: [phone]  Fax: [phone]    Visit Info:    Effective Dates  3/9/2023 TO 2023   Frequency/Duration   1-2 times a week for 90 days    SPEECH LANGUAGE PATHOLOGY: COMMUNICATION    Initial Assessment 3/9/2023     Appt Desk   Episode        Treatment Diagnosis: R13.11 Dysphagia, Oral Phase  R47.1 Dysarthria and Anarthria  R41.841 Cognitive-Communication Deficit  Medical/Referring Diagnosis:  No admission diagnoses are documented for this encounter. C76.124 Hemiplegia and hemiparesis following cerebral infarction affecting left non-dominant side, M62.81 Muscle weakness (generalized), R26.2 Difficulty in walking, not elsewhere classified, Z91.81 History of falling  Referring Physician:  Maribell Cain MD MD Orders:  Eval and Treat   Return MD Appt:  2023  Date of Onset:  2023  Allergies:  Amlodipine  Medications Last Reviewed:  3/9/2023     SUBJECTIVE    History of Injury/Illness (Reason for Referral):  Per chart review, 59 y.o. male with medical history of HTN, HIVhyperlipidemia, chronic A-fib, history of CVA with left hemiplegia, anxiety/depression. Patient with recent hx of acute right ischemic cerebellar infarction. Has residual left weakness and dysarthria from previous stroke ()  Patient Stated Goal(s):  Improve speech    Past Medical History/Comorbidities:   Mr. Candelario Chandler  has a past medical history of Stroke [facility]). Mr. Candelario Chandler  has no past surgical history on file. Current Communication Status:  Communication Observation: Dysarthria  Follows Directions: Moderate    Previous Speech Therapy: Seen by speech therapy in acute care setting for dysphagia, dysarthria, and language/cognition.  Diet recommendations at that time are as below:   RECOMMENDATIONS   Diet Solids Recommendation: Easy to Chew  Liquid Consistency Recommendation: Thin  Recommended Form of Meds: Meds in puree      Additional Recommendations:   Recommendations: Dysphagia treatment;Assistance with meals  Compensatory Swallowing Strategies : Upright as possible for all oral intake;Small bites/sips; Check for pocketing of food on the Left  Therapeutic Interventions: Patient/Family education;Diet tolerance monitoring      Patient continues to require skilled intervention: Yes  D/C Recommendations: Ongoing speech therapy is recommended during this hospitalization, Ongoing speech therapy is recommended at next level of care   Patient does report he has returned to regular diet and thin liquids since being home. Prior Level of Functioning:   Lives with independently. Has an aide come 9am-12 pm everyday [employer]). Requires max assist with ADLs. General: Patient denies difficulty with eating/drinking. Has difficulty with 1 pill at night but has been taking it with water. Recommended taking medications with puree. Per pt, slurred speech was very prominent during hospitalization, \"I could hardly understand myself. \" States speech has much improved since that time. OBJECTIVE    Oral Motor   Labial: Mild left droop (noted with fixed oral position with labial retraction when in conversation)  Dentition: Limited;Natural  Oral Hygiene: Moist  Lingual: Left deviation  Dentition: Some missing teeth    Assessment tool used: [place] Dysarthria Assessment Tool  Phonation:   Loudness: Adequate  Quality: WFL - no strained vocal quality noted  Maximum Phonation Time: 26.56 seconds WFL   S/Z ratio: WFL (19.91/20.45 seconds)  Pitch glide: Adequate 140-329 Hz  Resonance: Mild-mod hypo- nasality perceived in conversation;   Prosody: Able to vary prosody  Articulation: Alternating Motion Rates (AMR) performed and results as follows: /p/ 5.6 per second, /t/ 5 per second, /k/ 4.6 per second.  AMR norm is 5-7 productions per second; therefore patient's AMR rate of /p/, /t/ is within normal limits and AMR rate of /k/ is below normal limits. Sequential Motion Rates (SMR) performed and results are as follows: /p, t, k/ 2 per second. SMR norm is 3-5 productions per second; therefore, SMR rate is below normal limits. Dysarthria characterized by decreased articulatory precision and blended word boundaries. Speech intelligibility is mildly impacted in conversation level. The [address] Status(SLUMS) Examination other measures completed to evaluate cognitive linguistic functioning:   Orientation: 3/3  Recall(5 words)- immediate: 5; delayed:5  Problem solvin/3  Divergent naming(concrete category): 3/3  Digit manipulation:   Visuospatial: 2/  Clock drawin/4  Immediate recall (passage)-   Total score: 21/30 - mild neuro-cogntive disorder    ASSESSMENT    Initial Assessment:     Patient presents with mild dysarthria and mild cognitive linguistic deficits. Dysarthria is characterized by decreased articulatory precision, blended word boundaries, and decreased Sequential Motion Rates, which has a mild impact on speech intelligibility in conversation. Patient presents with mild deficits in the areas of immediate and delayed memory recall, and reasoning skills, related to cognitive-linguistic skills. Basic expressive and receptive language skills are intact. Patient reports difficulty swallowing 1 pill taken at night time, but has been taking with water. Recommended taking medications with puree as able. Reports no further difficulty with po intake. Speech therapy services are required to address aforementioned difficulties, as patient is currently functioning below baseline in regards to speech and cognitive linguistic abilities. Problem List: (Impacting functional limitations):    CVA  Dysarthria  Cognitive-linguistic skills  Therapy Prognosis:   Good  RECOMMENDATIONS   Recommendations:    Yes.  Speech therapy services are clinically indicated at this time to address dysarthria and cognitive-linguistic skills. PLAN    Interventions Planned (Treatment may consist of any combination of the following):    Cognitive linguistic based treatment, Safe swallow precautions, Motor speech based treatment, Training in compensatory strategies, and Education  Goals: (Goals have been discussed and agreed upon with patient.)  Short-Term Functional Goals: Time Frame: 8 weeks  Patient will improve respiratory coordination with phonation at phrase/sentence/paragraph level to increase functional speech intelligibility with moderate cues to 90%. Patient will articulate consonants at the word, phrase, and sentence level with 90% accuracy with min cues. Patient will use the (over articulation/slow rate/writing key word/elongation of the vowel/increased loudness/phrasing) strategy to improve speech intelligibility in sentences/in conversation with 90% accuracy. Patient will complete reasoning tasks to improve problem solving and safety awareness with 80% accuracy given min cueing. Patient will demonstrate recall of functional information following a/an (immediate, short-term, long-term) delay with mod cues in order to increase functional integration into environment at 80% accuracy. Patient will sequence multiple step tasks based in daily activities with 80% accuracy with min verbal cues. Discharge Goals: Time Frame: 12 weeks  Patient will improve neuro-linguistic abilities to increase safety and awareness in functional living environment. Patient will develop functional and intelligible speech and utilize compensatory strategies to improve communication across environments       Outcome Measure:   MEMORY: Level 5:  The individual consistently requires minimal cues to recall or use external memory aids for complex and novel information.  The individual consistently requires minimal cues to plan and follow through on complex future events (e.g., menu planning and meal preparation, planning a party, etc.). MOTOR SPEECH: Level 5:  The individual is able to speak intelligibly using simple sentences in daily routine activities with both familiar and unfamiliar communication partners. The individual occasionally requires minimal cueing to produce more complex sentences/messages in routine activities, although accuracy may vary and the individual may occasionally use compensatory strategies. PROBLEM SOLVING: Level 5:  The individual demonstrates functional problem solving skills in routine daily activities. He/she rarely requires minimal cueing/assistance or additional time to recognize problems, identify various solutions and carry out steps to complete simple problem solving tasks. The individual usually requires moderate cues/assistance to identify salient features of complex problems and occasionally provides appropriate solutions. He/she usually needs additional time to complete complex problem solving tasks and occasionally self-monitors effectiveness of performance and uses strategies when encountering difficulty. Distant supervision may be required to complete complex problem solving tasks. Initial: 5 (Date: 3/9/2023)    Medical Necessity/Other Comments:   Patient is expected to demonstrate progress in expressive communication and cognitive ability to decrease assistance required communication, increase independence with activities of daily living, and increase communication with family/caregivers. Skilled intervention continues to be required due to onset of CVA. Total Duration:  Time In: 0800  Time Out: 0840  Minutes: [facility] therapy, I certify that the treatment plan above will be carried out by a therapist or under their direction.   Thank you for this referral,  Kelli Valentin, SLP     Referring Physician Signature: Marj Mcgowan MD _______________________________ Date _____________

## 2023-03-08 ENCOUNTER — APPOINTMENT (OUTPATIENT)
Dept: PHYSICAL THERAPY | Age: 58
End: 2023-03-08
Payer: MEDICAID

## 2023-03-08 ENCOUNTER — HOSPITAL ENCOUNTER (OUTPATIENT)
Dept: PHYSICAL THERAPY | Age: 58
Setting detail: RECURRING SERIES
End: 2023-03-08
Payer: MEDICAID

## 2023-03-09 ENCOUNTER — HOSPITAL ENCOUNTER (OUTPATIENT)
Dept: PHYSICAL THERAPY | Age: 58
Setting detail: RECURRING SERIES
Discharge: HOME | End: 2023-03-12
Payer: MEDICAID

## 2023-03-09 PROCEDURE — 97162 PT EVAL MOD COMPLEX 30 MIN: CPT

## 2023-03-09 PROCEDURE — 92523 SPEECH SOUND LANG COMPREHEN: CPT

## 2023-03-09 PROCEDURE — 97165 OT EVAL LOW COMPLEX 30 MIN: CPT

## 2023-03-09 ASSESSMENT — PAIN SCALES - GENERAL
PAINLEVEL_OUTOF10: 0
PAINLEVEL_OUTOF10: 0

## 2023-03-09 NOTE — THERAPY EVALUATION
Colleen Zhao  : 1965  [place]  Secondary:  Kade Leach @ [address] [address]  Phone: [phone]  Fax: [phone] Plan Frequency: 1-2 times per week for 60-90 days  Plan of Care/Certification Expiration Date: 23    PT Visit Info:   Plan Frequency: 1-2 times per week for 60-90 days  Plan of Care/Certification Expiration Date: 23  Total # of Visits to Date: 1  Progress Note Counter: 1  Progress Note Due Date: 23    Visit Count:  1                OUTPATIENT PHYSICAL THERAPY:             OP NOTE TYPE: Initial Assessment 3/9/2023               Episode (PT: CVA) Appt Desk         Treatment Diagnosis:  Generalized Muscle Weakness (M62.81)  Difficulty in walking, Not elsewhere classified (R26.2)  Other abnormalities of gait and mobility (R26.89)  Unspecified Lack of Coordination (R27.9)  Hemiplegia and hemiparesis following cerebral infarction affecting left non-dominant side (I69.354)  Abnormal posture (R29.3)  Medical/Referring Diagnosis:  No admission diagnoses are documented for this encounter. N45.016 (ICD-10-CM) - Hemiplegia and hemiparesis following cerebral infarction affecting left non-dominant side   M62.81 (ICD-10-CM) - Muscle weakness (generalized)   R26.2 (ICD-10-CM) - Difficulty in walking, not elsewhere classified   Z91.81 (ICD-10-CM) - History of falling     Referring Physician:  Lucía Amin MD MD Orders:  PT Eval and Treat   Return MD Appt:    Date of Onset:  Onset Date:  ()    Allergies:  Amlodipine  Restrictions/Precautions:           Medications Last Reviewed:  3/9/2023     SUBJECTIVE   History of Injury/Illness (Reason for Referral):    Per patient, last fall 4 months ago, when ran off sidewalk in wheelchair. Trouble with standing, walking, getting L side to do something. No dizziness. Lives alone. In duplex one level to enter.    Tub/shower with bench, aid helps get in/out. Toilet transfers with elevated commode, independent per patient. Stand/pivot. Sometimes pain L shoulder. Had PT [facility]) about 6-7 months ago about 12 visits. Only worked on exercise. No change in mobility since 2021 stroke. Independent prior to 2021 stroke. Recent hospitalization 2/16/23 to 2/20/23:  MRI brain;  1. Acute right cerebellar infarction. 2. Moderate chronic small vessel ischemic change. 3. Remote right basal ganglia/corona radiata lacunar infarction. Pt with hx significant for R CVA, L deficits UE/LE in 2021. Pt A & O x 3, reports lives alone, uses power chair at baseline, independent ADLs, transfers; has aide M-F 9-12 for household needs. Pt reports family does not assist in care. Patient Stated Goal(s):  \"I want to walk\"  Initial:     0/10 Post Session:     0/10   Past Medical History/Comorbidities:   Mr. Raul Andrade  has a past medical history of Stroke [facility]). Mr. Raul Andrade  has no past surgical history on file. HTN, HIVhyperlipidemia, chronic A-fib, history of CVA with left hemiplegia 2021, anxiety/depression. Social History/Living Environment:       Lives With: Alone  Type of Home: Senior housing apartment (duplex)  Home Layout: One level  Home Access: Level entry  [address] [facility]: 3-in-1 commode, tub bench  Home Equipment: Wheelchair-electric  ADL Assistance: Independent  Ambulation Assistance: Non-ambulatory (power chair)    Prior Level of Function/Work/Activity:   Pt with hx significant for R CVA, L deficits UE/LE in 2021. Pt A & O x 3, reports lives alone, uses power chair at baseline, independent ADLs, transfers; has aide M-F 9-12 for household needs.                Learning:   Does the patient/guardian have any barriers to learning?: No barriers  Will there be a co-learner?: No  What is the preferred language of the patient/guardian?: English  Is an  required?: No  How does the patient/guardian prefer to learn new concepts?: Listening; Demonstration; Pictures/Videos     Fall Risk Scale: Arroyo Total Score: 25  Arroyo Fall Risk: Medium (25-44)     Dominant Side:  right handed  Previous Treatment Approaches:  Therapies in acute care during recent hospitalization  Personal Factors:        Sex:  male        Age:  59 y.o. OBJECTIVE   Observations: In wheelchair, leaning R; L ankle crossed over R; Leans R in standing with L knee and hip flexed and foot off of the ground  Prior Level of Function:   Independent with stand pivot transfers and bed mobility; power wheelchair  Current Level of Function:   Same as above      Sensation:   intact  Bed Mobility:   independent  Transfers:   Stand pivot transfer on R LE  ROM:   R hamstring tightness  Strength:   R LE STRENGTH:  4/5 hip flexion, 4/5 hip abduction, 4/5 hip adduction, 4/5 hip extension, 4/5 knee extension, 4/5 knee flexion, 4/5 ankle dorsiflexion, 4/5 ankle plantarflexion, 4/5 ankle inversion, 4/5 ankle eversion. L LE STRENGTH:  4-/5 hip flexion, 0/5 hip abduction, 1/5 hip adduction, 1/5 hip extension, 1/5 knee extension, 1/5 knee flexion, 0/5 ankle dorsiflexion, 1/5 ankle plantarflexion, 0/5 ankle inversion, 0/5 ankle eversion. Ambulation/WC:     No ambulation. Stood in parallel bars with R UE assist on bar. Sit<>stand without PT assist with R bar. Patient stands on R LE, L hip/knee flexed with foot off floor. Patient is unable to actively extend L LE to put foot on floor. Applied knee immobilizer on L LE and stood again. Able to work on putting L foot on floor in standing and wt shifting R<>L with R UE assist.   ASSESSMENT   Initial Assessment:  Patient presents with history of CVA in 2021 resulting in L sided weakness. Patient has not ambulated since stroke in 2021 and reports no significant change in function since the stroke. Patient is able to stand pivot transfer using R UE and LE without assist. He states that he has a power wheelchair at home for mobility.  Patient says that he wants to be able to walk but has not made any gains in that area since his stroke in 2021. Patient is not able to actively extend L LE to place on floor and bear weight without assist in standing which will make gait training and strengthening challenging . Patient would benefit from PT to work on strength and mobility to improve patient's independence and safety with mobility and daily activities as he lives alone. Thank you. Problem List: (Impacting functional limitations): Body Structures, Functions, Activity Limitations Requiring Skilled Therapeutic Intervention: Decreased functional mobility ; Decreased strength; Decreased ROM; Decreased endurance; Decreased balance; Decreased posture; Decreased coordination     Therapy Prognosis:   Therapy Prognosis: Fair; Poor     Initial Assessment Complexity:   Decision Making: Medium Complexity  PLAN   Effective Dates: 3/9/2023 TO Plan of Care/Certification Expiration Date: 06/07/23   Frequency/Duration: Plan Frequency: 1-2 times per week for 60-90 days   Interventions Planned (Treatment may consist of any combination of the following):    Current Treatment Recommendations: Strengthening; ROM; Balance training; Functional mobility training; Transfer training; Endurance training; Wheelchair mobility training; Gait training; Neuromuscular re-education; Manual; Home exercise program; Safety education & training; Patient/Caregiver education & training; Modalities; Positioning; Therapeutic activities     Goals: (Goals have been discussed and agreed upon with patient.)  Short-Term Functional Goals: Time Frame: 2-4 weeks  Patient will demonstrate independence and compliance with home exercise program to improve posture and strength for daily activities. Discharge Goals: Time Frame: 8-12 weeks  Patient will demonstrate safe and independent transfers from bed/chair<>wheelchair to decrease fall risk and improve independence.   Patient will be able to stand safely and independently with R UE assist to improve posture and mobility for ADLs. Patient will demonstrate independence with discharge home exercise program to improve ROM and strength for daily activities and mobility. Outcome Measure: Tool Used: [address] [place] [id number] AM-PAC Basic Mobility Inpatient Short Form \"6 Clicks\"  Score:  Initial: 17 Most Recent: X (Date: -- )   Interpretation of Tool:  Represents activities that are increasingly more difficult (i.e. Bed mobility, Transfers, Gait). © 2007, Trustees of [address] [place] [address], under license to [employer]. All rights reserved. Tool Used: Mazariegos Balance Scale  Score:  Initial: 5/56 Most Recent: X/56 (Date: -- )   Interpretation of Score: Each section is scored on a 0-4 scale, 0 representing the patients inability to perform the task and 4 representing independence. The scores of each section are added together for a total score of 56. The higher the patients score, the more independent the patient is. Any score below 45 indicates increased risk for falls. Medical Necessity:   > Patient is expected to demonstrate progress in strength, balance, and functional technique to improve safety during mobility and daily activities. Reason For Services/Other Comments:  > Patient continues to demonstrate capacity to improve strength, balance, mobility which will increase independence and increase safety. Total Duration:  Time In: 0845  Time Out: [address] JANINA. Stefano Zhao's therapy, I certify that the treatment plan above will be carried out by a therapist or under their direction.   Thank you for this referral,  Giuseppe Gutierrez, PT     Referring Physician Signature: Eveline Frost MD _______________________________ Date _____________        Post Session Pain  Charge Capture  PT Visit Info MD Melissa Sanfordhart

## 2023-03-09 NOTE — PROGRESS NOTES
Toma Zhao  : 1965  [place]  Secondary:  [address] @ [address] [address]  Phone: [phone]  Fax: [phone] Plan Frequency: 1-2 times per week for 60-90 days  Plan of Care/Certification Expiration Date: 23    PT Visit Info:  Plan Frequency: 1-2 times per week for 60-90 days  Plan of Care/Certification Expiration Date: 23  Total # of Visits to Date: 1  Progress Note Counter: 1  Progress Note Due Date: 23    Visit Count:  1    OUTPATIENT PHYSICAL THERAPY:OP NOTE TYPE: Treatment Note 3/9/2023       Episode  }Appt Desk             Treatment Diagnosis:  Generalized Muscle Weakness (M62.81)  Difficulty in walking, Not elsewhere classified (R26.2)  Other abnormalities of gait and mobility (R26.89)  Unspecified Lack of Coordination (R27.9)  Hemiplegia and hemiparesis following cerebral infarction affecting left non-dominant side (I69.354)  Abnormal posture (R29.3)  Medical/Referring Diagnosis:  No admission diagnoses are documented for this encounter. R70.304 (ICD-10-CM) - Hemiplegia and hemiparesis following cerebral infarction affecting left non-dominant side   M62.81 (ICD-10-CM) - Muscle weakness (generalized)   R26.2 (ICD-10-CM) - Difficulty in walking, not elsewhere classified   Z91.81 (ICD-10-CM) - History of falling     Referring Physician:  Leisa Portillo MD MD Orders:  PT Eval and Treat   Date of Onset:  Onset Date:  ()   Allergies:   Amlodipine  Restrictions/Precautions:  Restrictions/Precautions: Fall Risk  No data recorded     Interventions Planned (Treatment may consist of any combination of the following):    Current Treatment Recommendations: Strengthening; ROM; Balance training; Functional mobility training; Transfer training; Endurance training;  Wheelchair mobility training; Gait training; Neuromuscular re-education; Manual; Home exercise program; Safety education & training; Patient/Caregiver education & training; Modalities; Positioning; Therapeutic activities     Subjective Comments:   I want to walk  Initial:}    0/10Post Session:       0/10  Medications Last Reviewed:  3/9/2023  Updated Objective Findings:  See evaluation note from today  Treatment   THERAPEUTIC EXERCISE: (0 minutes):    Exercises per grid below to improve mobility and strength. Required minimal verbal cues to promote proper body alignment, promote proper body posture, and promote proper body mechanics. Progressed resistance, range, repetitions, and complexity of movement as indicated. Date:   Date:   Date:     Activity/Exercise Parameters Parameters Parameters                                                 Treatment/Session Summary:    Treatment Assessment:   Patient was fatigued with assessment today. Communication/Consultation:  None today  Equipment provided today:  none  Recommendations/Intent for next treatment session: Next visit will focus on strengthening, stretching, mobility training.     Total Treatment Billable Duration:  Evaluation only  Time In: 0845  Time Out: [address] [facility], PT       Charge Capture  }Post Session Pain  PT Visit Info  [employer] Portal  MD Guidelines  Scanned Media  Benefits  MyChart    Future Appointments   Date Time Provider Johan Telles   4/17/2023  9:30 AM DO MARIZA Puckett [facility]   5/8/2023  8:40 AM EMILI Silva - CNP [facility]   6/6/2023 10:40 AM EMILI Ewing BSND [facility]

## 2023-03-09 NOTE — THERAPY EVALUATION
Ced Zhao  : 1965  Primary: [place]icaid)  Secondary:  [facility] Therapy [facility]  [address]  [address] [address]  Phone: [phone]  Fax: [phone]    OT Visit Info:  Plan Frequency: 1-2 times/week  Certification Period Expiration Date: 23  Total # of Visits to Date: 1  Progress Note Counter: 1  Progress Note Due Date: 23   Visit Count: Visit count could not be calculated. Make sure you are using a visit which is associated with an episode. OUTPATIENT OCCUPATIONAL THERAPY:OP NOTE TYPE: Initial Assessment 3/9/2023  Episode: (OT: CVA)   Appt Desk        Treatment Diagnosis:  Other lack of cordination (R27.8)  Hemiplegia and hemiparesis following cerebral infarction affecting left non-dominant side (F72.391)  Medical/Referring Diagnosis:  No admission diagnoses are documented for this encounter. Referring Physician:  Yovani Rai MD MD Orders:  OT Eval and Treat   Return MD Appt:  N/A  Date of Onset:  Onset Date:  ()   Allergies:  Amlodipine  Restrictions/Precautions:    Restrictions/Precautions: Fall Risk  No data recorded  Medications Last Reviewed:  3/9/2023     SUBJECTIVE   History of Injury/Illness (Reason for Referral):   States he is able to sponge bathe himself. Sometimes gets help with his shirts. Doesn't have any UE braces. Per patient, last fall 4 months ago, when ran off sidewalk in wheelchair. Trouble with standing, walking, getting L side to do something. No dizziness. Lives alone. In duplex one level to enter. Tub/shower with bench, aid helps get in/out. Toilet transfers with elevated commode, independent per patient. Stand/pivot. Sometimes pain L shoulder. [facility]) about 6-7 months ago about 12 visits. Only worked on exercise. No change in mobility since  stroke. Independent prior to  stroke. Recent hospitalization 23 to 23:  MRI brain;  1.  Acute right cerebellar infarction.   2. Moderate chronic small vessel ischemic change.   3. Remote right basal ganglia/corona radiata lacunar infarction.   Pt with hx significant for R CVA, L deficits UE/LE in 2021. Pt A & O x 3, reports lives alone, uses power chair at baseline, independent ADLs, transfers; has aide M-F 9-12 for household needs. Pt reports family does not assist in care.    Patient Stated Goal(s):  \"to move this left side.\"  Initial:     0/10 Post Session:     0/10  Past Medical History/Comorbidities:   Mr. Zhao  has a past medical history of Stroke (HCC).  Mr. Zhao  has no past surgical history on file.  Social History/Living Environment:   Lives With: Alone  Type of Home: Senior housing apartment (duplex)  Home Layout: One level  Home Access: Level entry  Bathroom Equipment: 3-in-1 commode  Home Equipment: Wheelchair-electric     Prior Level of Function/Work/Activity:   Occupation: On disability  Receives Help From: Personal care attendant (CLTC aide)  ADL Assistance: Independent  Homemaking Assistance: Needs assistance  Ambulation Assistance: Non-ambulatory (power chair)  Transfer Assistance: Needs assistance  Active : No  Patient's  Info: CLTC aide or [employer] van  No data recorded     Learning:   Does the patient/guardian have any barriers to learning?: No barriers  Will there be a co-learner?: No  What is the preferred language of the patient/guardian?: English  Is an  required?: No  How does the patient/guardian prefer to learn new concepts?: Listening; Demonstration; Pictures/Videos     Fall Risk Scale  Arroyo Total Score: 25  Arroyo Fall Risk: Medium (25-44)     Dominant Side:  right handed  Previous Treatment Approaches:  Inpatient rehab at [facility] following CVA.      OBJECTIVE   OBSERVATIONS: L hemibody hemiplegia with 1-2 finger width shoulder subluxation.  Completes w/c to mat/mat to w/c transfer standing wholly on his R LE and pivoting.       ADL:  ADL  Feeding: Modified  independent   Grooming: Modified independent   UE Bathing: Stand by assistance  LE Bathing: Stand by assistance  UE Dressing: Moderate assistance  LE Dressing: Minimal assistance  Toileting: Stand by assistance   VISION/HEARING:  Vision  Vision: Within Functional Limits (grossly)  Hearing  Hearing: Within functional limits (grossly)   QUALITY OF MOVEMENT:  Tone  LUE Tone: Mixed  Tone Description: Finger flexors hypertonic; shoulder hypotonic  Shoulder subluxation: Yes - left   SENSATION:  Sensation  Overall Sensation Status: Impaired  Additional Comments: Not formally assessed, but L UE sensorimotor deficits present    PERCEPTION:  Perception  Overall Perceptual Status: Impaired  Unilateral Attention: Cues to maintain midline in standing, Cues to attend to left side of body    COGNITION:  Cognition  Cognition Comment: Follows 1-2 step commands consistently     SKIN INTEGUMENTARY:  Skin Integumentary   Location Description: L UE grossly intact   RANGE OF MOTION:  LUE PROM (degrees)  LUE PROM: Exceptions  L Shoulder Flex  0-180: 30%  L Shoulder ABduction 0-180: 30%  L Shoulder Ext Rotation  0-90: 20%  L Elbow Flexion 0-145: 75%  L Elbow Extension 145-0: 85%  L Forearm Pronation  0-90: 90%  L Forearm Supination  0-90: 75%  L Wrist Ext 0-70: 50% (Finger extension 50%)   STRENGTH:   Strength RUE  Strength RUE: WFL  Strength LUE  Strength LUE:  (L UE 0/5; except elbow flexion: 2-/5 thumb flexion: 2-/5)   HAND STRENGTH:      HAND ASSESSMENT:      ASSESSMENT   Initial Assessment:  Anca Zhao is s/p CVA affecting the L hemibody in 2020 with residual L UE hemiplegia, hypertonicity, and sensorimotor deficits. His L UE basically has no active movement and the shoulder is subluxed. He is w/c bound and only utilizes his R UE/R LE to pivot over to the chair. He may benefit from a better power w/c than he has. He also would benefit from self care management training.   Lastly, he would benefit from contracture prevention and possibly L UE remediation techniques to maximize his overall independence with ADL. Problem List (Impacting functional limitations):  Performance deficits / Impairments: Decreased functional mobility ; Decreased ADL status; Decreased ROM; Decreased strength; Decreased balance; Decreased sensation; Decreased high-level IADLs; Decreased fine motor control; Decreased posture; Decreased coordination     Therapy Prognosis:   Prognosis: Guarded     Assessment Complexity:   Low Complexity  PLAN   Effective Dates: 9/1/5448  TO Certification Period Expiration Date: 06/07/23 . Frequency/Duration: Plan Frequency: 1-2 times/week   Interventions Planned: (Treatment may consist of any combination of the following)  Current Treatment Recommendations: Strengthening; ROM; Neuromuscular re-education; Safety education & training; Patient/Caregiver education & training; Equipment evaluation, education, & procurement; Modalities; Positioning; Self-Care / ADL; Dry needling; Manual Therapy:  GILSON; Manual Therapy:  Negro Youssef; Other (comment) (Orthotic management and training)     Goals: (Goals have been discussed and agreed upon with patient.)  Short-Term Functional Goals: Time Frame: 4 weeks  Complete home exercise program independently. Complete upper body dressing with modified independence. Be fitted for L shoulder support and L resting hand orthosis as needed to reduce contractures, decrease pain and overall independence with ADL and functional mobility for ADL. Discharge Goals: Time Frame: 12 weeks  Be fitted for power w/c and seating system to maximize his overall independence with ADL and instrumental ADL. Improve L UE motion to at least 25%. Complete ADL with modified independence. Outcome Measure:       [address]\"" [address] [address] AM-PACTM \"6 Clicks\"           Daily Activity Inpatient Short Form  How much help from another person does the patient currently need. .. Total A Lot A Little None   1.   Putting on and taking off regular lower body clothing? [] 1   [] 2   [x] 3   [] 4   2. Bathing (including washing, rinsing, drying)? [] 1   [] 2   [x] 3   [] 4   3. Toileting, which includes using toilet, bedpan or urinal?   [] 1   [] 2   [x] 3   [] 4   4. Putting on and taking off regular upper body clothing? [] 1   [x] 2   [] 3   [] 4   5. Taking care of personal grooming such as brushing teeth? [] 1   [] 2   [x] 3   [] 4   6. Eating meals? [] 1   [] 2   [x] 3   [] 4   © 2007, Trustees of [facility] [employer] [employer], under license to [employer]. All rights reserved     Score:  Initial: 17 Most Recent: X (Date: -- )   Interpretation of Tool:  Represents clinically-significant functional categories (i.e.Activities of daily living). MEDICAL NECESSITY:   > Skilled intervention continues to be required due to CVA affecting the L UE.  REASON FOR SERVICES/OTHER COMMENTS:  > Patient continues to require skilled intervention due to decreased overall independence with ADL and instrumental ADL. Total Duration:  Time In: 0930  Time Out: 5097    Regarding Félix Zhao's therapy, I certify that the treatment plan above will be carried out by a therapist or under their direction.   Thank you for this referral,  Conrad Mayo OT     Referring Physician Signature: Jordyn Xavier MD                    Post Session Pain  Charge Capture   POC Link  Treatment Note Link  MD Guidelines  [facility]

## 2023-03-23 ENCOUNTER — APPOINTMENT (OUTPATIENT)
Dept: PHYSICAL THERAPY | Age: 58
End: 2023-03-23
Payer: MEDICAID

## 2023-03-23 ENCOUNTER — HOSPITAL ENCOUNTER (OUTPATIENT)
Dept: PHYSICAL THERAPY | Age: 58
Setting detail: RECURRING SERIES
End: 2023-03-23
Payer: MEDICAID

## 2023-03-28 ENCOUNTER — HOSPITAL ENCOUNTER (OUTPATIENT)
Dept: PHYSICAL THERAPY | Age: 58
Setting detail: RECURRING SERIES
End: 2023-03-28
Payer: MEDICAID

## 2023-03-28 ENCOUNTER — APPOINTMENT (OUTPATIENT)
Dept: PHYSICAL THERAPY | Age: 58
End: 2023-03-28
Payer: MEDICAID

## 2023-03-28 NOTE — PROGRESS NOTES
Ta Kenny Zhao  : 1965  Primary: [place]  Secondary:  Thomas Barney  [address]  [address] [address]  Phone: [phone]  Fax: [phone]    PT Visit Info: Total # of Visits to Date: 1  Progress Note Counter: 1  Progress Note Due Date: 23  Canceled Appointment: 1 (3/28/2023)     OT Visit Info: Total # of Visits to Date: 1  Progress Note Counter: 1  Progress Note Due Date: 23  Canceled Appointment: 1      OUTPATIENT THERAPY: 3/28/2023  Episode  Appt Desk        Shubham Zhao cancelled his appointment for today due to transportation issues. Will plan to follow up next during next appointment.   Thank you,  Kassandra Garcia, OT    Future Appointments   Date Time Provider Johan Telles   3/28/2023  7:00 PM David Oh [facility]   3/28/2023  7:45 PM Rebecca Hillman, OT [facility]   3/28/2023  8:30 PM Wilfrido Arvizu, PT [facility]   3/31/2023  9:30 AM Dyan Wang, PT [facility]   3/31/2023 10:15 AM [facility]   3/31/2023 11:00 AM JOSE A Oh [facility]   2023  9:30 AM DO MARY BarnettDG [facility]   2023  8:40 AM EMILI Harvey - CNP PSCD [facility]   2023  9:40 AM EMILI Ayala BSND [facility]

## 2023-03-28 NOTE — PROGRESS NOTES
Leoncio Rosas Zhao  : 1965  Primary: Medicaid [place]  Secondary:  [facility]  [address] [address]  Phone: [phone]  Fax: [phone]    PT Visit Info: Total # of Visits to Date: 1  Progress Note Counter: 1  Progress Note Due Date: 23  Canceled Appointment: 1 (3/28/2023)     OT Visit Info: Total # of Visits to Date: 1  Progress Note Counter: 1  Progress Note Due Date: 23      OUTPATIENT THERAPY: 3/28/2023  Episode  Appt Desk        Kulwant Zhao cancelled his appointment for today due to transportation issues. Will plan to follow up next during next appointment.   Thank you,  Gab Schmidt, PT    Future Appointments   Date Time Provider Johan Telles   3/28/2023  7:00 PM Hema Torrez, [facility]   3/28/2023  7:45 PM Delmi Ray, OT [facility]   3/28/2023  8:30 PM Alexandru Anderson, PT [facility]   3/31/2023  9:30 AM Tere Cruz, PT [facility]   3/31/2023 10:15 AM Delmi Ray OT [facility]   3/31/2023 11:00 AM Hema Torrez, SLP [facility]   2023  9:30 AM Francisco Zamora DO UCDG [facility] AM[facility]   2023  8:40 AM EMILI Duffy - CNP PSCD [facility] AM[facility]   2023  9:40 AM EMILI Carbajal BSND [facility] AM[facility]

## 2023-03-31 ENCOUNTER — HOSPITAL ENCOUNTER (OUTPATIENT)
Dept: PHYSICAL THERAPY | Age: 58
Setting detail: RECURRING SERIES
End: 2023-03-31
Payer: MEDICAID

## 2023-03-31 NOTE — PROGRESS NOTES
Yair Zhao  : 1965  Primary: [place]  Secondary:  [facility]  [address] [address]  Phone: [phone]  Fax: [phone]    PT Visit Info: Total # of Visits to Date: 1  Progress Note Counter: 1  Progress Note Due Date: 23  Canceled Appointment: 1 (3/28/2023)     OT Visit Info: Total # of Visits to Date: 1  Progress Note Counter: 1  Progress Note Due Date: 23  No Show: 1  Canceled Appointment: 1      OUTPATIENT THERAPY: 3/31/2023  Episode  Appt Desk        Alivia Doran Junior Cece did not show for his appointment for today due to unknown reasons.    Thank you,  Melody Erickson, PT    Future Appointments   Date Time Provider Johan Telles   2023  9:30 AM DO MARIZA Cadet [facility]   2023  8:40 AM EMILI Pham - CNP [facility]   2023  9:40 AM EMILI Arnold BSND [facility] AM[facility]

## 2023-03-31 NOTE — PROGRESS NOTES
Diann Zhao  : 1965  Primary: Medicaid [place]  Secondary:  Hal Mcdaniel  [facility]  [address] [address]  Phone: [phone]  Fax: [phone]    PT Visit Info: Total # of Visits to Date: 1  Progress Note Counter: 1  Progress Note Due Date: 23  Canceled Appointment: 1 (3/28/2023)     OT Visit Info: Total # of Visits to Date: 1  Progress Note Counter: 1  Progress Note Due Date: 23  No Show: 1  Canceled Appointment: 1      OUTPATIENT THERAPY: 3/31/2023  Episode  Appt Desk        Primitivo Zhao did not show for his appointment for today due to unknown reasons. OT called and left voicemail message to check on patient. He does not have any additional visits scheduled at this time.   Thank you,  Tina Chiu, OT    Future Appointments   Date Time Provider Johan eTlles   3/31/2023 11:00 AM Althea [facility]   2023  9:30 AM Arabella Spears DO UCDG [facility]   2023  8:40 AM EMILI Ribera - CNP PS[facility]   2023  9:40 AM EMILI Escobedo BSND [facility] AM[facility]

## 2023-03-31 NOTE — PROGRESS NOTES
Pa Carlsondewayne Zhao  : 1965  Primary: [place]  Secondary:  Vielka File  [address] [address]  Phone: [phone]  Fax: [phone]    PT Visit Info: Total # of Visits to Date: 1  Progress Note Counter: 1  Progress Note Due Date: 23  Canceled Appointment: 1 (3/28/2023)     OT Visit Info: Total # of Visits to Date: 1  Progress Note Counter: 1  Progress Note Due Date: 23  No Show: 1  Canceled Appointment: 1     SLP Visit Info:  No Show: 1  Canceled Appointment: 2 (3/28/2023 - no transportation)    OUTPATIENT THERAPY: 3/31/2023  Episode  Appt Desk        Rosa Novak Junior Zhao did not show for his appointment for today due to unknown reasons.     Thank you,  Marge Frederick, SLP    Future Appointments   Date Time Provider Johan Telles   2023  9:30 AM DO MARY PowersDG [facility] AM[facility]   2023  8:40 AM EMILI Haney - CNP PSCD [facility]   2023  9:40 AM EMILI Seals BSND [facility] AM[facility]

## 2023-04-17 ENCOUNTER — INITIAL CONSULT (OUTPATIENT)
Dept: CARDIOLOGY CLINIC | Age: 58
End: 2023-04-17
Payer: MEDICAID

## 2023-04-17 VITALS
SYSTOLIC BLOOD PRESSURE: 150 MMHG | DIASTOLIC BLOOD PRESSURE: 90 MMHG | HEIGHT: 69 IN | BODY MASS INDEX: 29.53 KG/M2 | HEART RATE: 68 BPM

## 2023-04-17 DIAGNOSIS — Z86.73 HISTORY OF CVA IN ADULTHOOD: ICD-10-CM

## 2023-04-17 DIAGNOSIS — I10 ESSENTIAL HYPERTENSION: ICD-10-CM

## 2023-04-17 DIAGNOSIS — E11.9 TYPE 2 DIABETES MELLITUS WITHOUT COMPLICATION, UNSPECIFIED WHETHER LONG TERM INSULIN USE (HCC): ICD-10-CM

## 2023-04-17 DIAGNOSIS — I48.0 PAROXYSMAL ATRIAL FIBRILLATION (HCC): Primary | ICD-10-CM

## 2023-04-17 DIAGNOSIS — Z21 ASYMPTOMATIC HIV INFECTION, WITH NO HISTORY OF HIV-RELATED ILLNESS (HCC): ICD-10-CM

## 2023-04-17 DIAGNOSIS — E78.2 MIXED HYPERLIPIDEMIA: ICD-10-CM

## 2023-04-17 PROCEDURE — 3044F HG A1C LEVEL LT 7.0%: CPT | Performed by: INTERNAL MEDICINE

## 2023-04-17 PROCEDURE — 3077F SYST BP >= 140 MM HG: CPT | Performed by: INTERNAL MEDICINE

## 2023-04-17 PROCEDURE — 99204 OFFICE O/P NEW MOD 45 MIN: CPT | Performed by: INTERNAL MEDICINE

## 2023-04-17 PROCEDURE — 93000 ELECTROCARDIOGRAM COMPLETE: CPT | Performed by: INTERNAL MEDICINE

## 2023-04-17 PROCEDURE — 3080F DIAST BP >= 90 MM HG: CPT | Performed by: INTERNAL MEDICINE

## 2023-04-17 RX ORDER — LISINOPRIL 40 MG/1
40 TABLET ORAL DAILY
Qty: 30 TABLET | Refills: 11 | Status: SHIPPED | OUTPATIENT
Start: 2023-04-17

## 2023-04-17 ASSESSMENT — ENCOUNTER SYMPTOMS
WHEEZING: 0
ABDOMINAL PAIN: 0
COUGH: 1
COLOR CHANGE: 0
SHORTNESS OF BREATH: 1
ORTHOPNEA: 0
NAUSEA: 1
ALLERGIC/IMMUNOLOGIC NEGATIVE: 1

## 2023-04-17 NOTE — PROGRESS NOTES
oxyCODONE-acetaminophen (PERCOCET) 5-325 MG per tablet, Take 1 tablet by mouth every 6 hours as needed for Pain. (Patient not taking: Reported on 4/17/2023), Disp: , Rfl:     terazosin (HYTRIN) 5 MG capsule, Take 5 mg by mouth daily (Patient not taking: Reported on 4/17/2023), Disp: , Rfl:     terbinafine (LAMISIL) 1 % cream, Apply topically Apply topically 2 times daily. (Patient not taking: Reported on 4/17/2023), Disp: , Rfl:     terbinafine (LAMISIL) 250 MG tablet, Take 250 mg by mouth daily (Patient not taking: Reported on 4/17/2023), Disp: , Rfl:     triamcinolone (KENALOG) 0.1 % cream, Apply topically . (Patient not taking: Reported on 4/17/2023), Disp: , Rfl:     Handicap Placard [facility], Supply prescription to [facility] with completed form [id number]., Disp: , Rfl:     ASSESSMENT/PLAN:    Cardiovascular Testing:  - EKG 2/16/23: Atrial fibrillation. 1. Paroxysmal atrial fibrillation (HCC)  - EKG as above in February 2023 with atrial fibrillation  - On Eliquis 5 mg PO BID for stroke prophylaxis  - [id number] = 4 ( stroke, HTN, DM) warrants anticoagulation for stroke prophylaxis. - Metoprolol for rate control  - patient with symptoms despite attempts at rate control plan for DILLAN guided cardioversion in an attempt to restore sinus rhythm, anesthesiology for sedation.   - EKG personally reviewed in office today demonstrates Atrial fibrillation   68 BPM, Can not rule out old anterior infarct. Diffuse nonspecific ST-abnormality. ABNORMAL ECG   - EKG 12 Lead - Clinic Performed    2. Essential hypertension  - uncontrolled  - increase Lisinopril to 40 mg PO Daily  - patient with elevated BP on check today  - will have the patient check BP at home on a daily basis and bring log to next visit   - goal per SPRINT guidelines <130/80 mmHg  - will review log at next visit for modification of the patient's anti-hypertensive regimen    3. Mixed hyperlipidemia  - on Atorvastatin 80 given CVA history    4.  History of CVA in

## 2023-04-25 ENCOUNTER — HOSPITAL ENCOUNTER (OUTPATIENT)
Dept: CARDIAC CATH/INVASIVE PROCEDURES | Age: 58
Discharge: HOME | End: 2023-04-25
Attending: INTERNAL MEDICINE | Admitting: INTERNAL MEDICINE
Payer: MEDICAID

## 2023-04-25 VITALS
RESPIRATION RATE: 14 BRPM | DIASTOLIC BLOOD PRESSURE: 128 MMHG | HEART RATE: 67 BPM | TEMPERATURE: 98.6 F | SYSTOLIC BLOOD PRESSURE: 174 MMHG | OXYGEN SATURATION: 98 % | HEIGHT: 69 IN | BODY MASS INDEX: 29.62 KG/M2 | WEIGHT: 200 LBS

## 2023-04-25 DIAGNOSIS — Z86.73 HISTORY OF CVA IN ADULTHOOD: ICD-10-CM

## 2023-04-25 DIAGNOSIS — I48.0 PAROXYSMAL ATRIAL FIBRILLATION (HCC): ICD-10-CM

## 2023-04-25 LAB
ANION GAP SERPL CALC-SCNC: 4 MMOL/L (ref 2–11)
BUN SERPL-MCNC: 11 MG/DL (ref 6–23)
CALCIUM SERPL-MCNC: 9.3 MG/DL (ref 8.3–10.4)
CHLORIDE SERPL-SCNC: 104 MMOL/L (ref 101–110)
CO2 SERPL-SCNC: 28 MMOL/L (ref 21–32)
CREAT SERPL-MCNC: 1.1 MG/DL (ref 0.8–1.5)
ECHO AO SINUS VALSALVA DIAM: 3.4 CM
ECHO AO SINUS VALSALVA INDEX: 1.64 CM/M2
ECHO BSA: 2.1 M2
ECHO LV INTERNAL DIMENSION SYSTOLIC INDEX: 0.77 CM/M2
ECHO LV INTERNAL DIMENSION SYSTOLIC: 1.6 CM
EKG ATRIAL RATE: 147 BPM
EKG DIAGNOSIS: NORMAL
EKG Q-T INTERVAL: 418 MS
EKG QRS DURATION: 78 MS
EKG QTC CALCULATION (BAZETT): 470 MS
EKG R AXIS: 147 DEGREES
EKG T AXIS: -39 DEGREES
EKG VENTRICULAR RATE: 76 BPM
ERYTHROCYTE [DISTWIDTH] IN BLOOD BY AUTOMATED COUNT: 15.1 % (ref 11.9–14.6)
GLUCOSE SERPL-MCNC: 86 MG/DL (ref 65–100)
HCT VFR BLD AUTO: 42.5 % (ref 41.1–50.3)
HGB BLD-MCNC: 14.2 G/DL (ref 13.6–17.2)
MAGNESIUM SERPL-MCNC: 1.6 MG/DL (ref 1.8–2.4)
MCH RBC QN AUTO: 29.1 PG (ref 26.1–32.9)
MCHC RBC AUTO-ENTMCNC: 33.4 G/DL (ref 31.4–35)
MCV RBC AUTO: 87.1 FL (ref 82–102)
NRBC # BLD: 0 K/UL (ref 0–0.2)
PLATELET # BLD AUTO: 177 K/UL (ref 150–450)
PMV BLD AUTO: 11.4 FL (ref 9.4–12.3)
POTASSIUM SERPL-SCNC: 3.7 MMOL/L (ref 3.5–5.1)
RBC # BLD AUTO: 4.88 M/UL (ref 4.23–5.6)
SODIUM SERPL-SCNC: 136 MMOL/L (ref 133–143)
WBC # BLD AUTO: 4.2 K/UL (ref 4.3–11.1)

## 2023-04-25 PROCEDURE — 6360000002 HC RX W HCPCS: Performed by: INTERNAL MEDICINE

## 2023-04-25 PROCEDURE — 83735 ASSAY OF MAGNESIUM: CPT

## 2023-04-25 PROCEDURE — 80048 BASIC METABOLIC PNL TOTAL CA: CPT

## 2023-04-25 PROCEDURE — 76376 3D RENDER W/INTRP POSTPROCES: CPT

## 2023-04-25 PROCEDURE — 85027 COMPLETE CBC AUTOMATED: CPT

## 2023-04-25 PROCEDURE — 99152 MOD SED SAME PHYS/QHP 5/>YRS: CPT | Performed by: INTERNAL MEDICINE

## 2023-04-25 PROCEDURE — 93312 ECHO TRANSESOPHAGEAL: CPT

## 2023-04-25 PROCEDURE — 93319 3D ECHO IMG CGEN CAR ANOMAL: CPT | Performed by: INTERNAL MEDICINE

## 2023-04-25 PROCEDURE — 93312 ECHO TRANSESOPHAGEAL: CPT | Performed by: INTERNAL MEDICINE

## 2023-04-25 PROCEDURE — 99152 MOD SED SAME PHYS/QHP 5/>YRS: CPT

## 2023-04-25 PROCEDURE — 6370000000 HC RX 637 (ALT 250 FOR IP): Performed by: INTERNAL MEDICINE

## 2023-04-25 PROCEDURE — 93320 DOPPLER ECHO COMPLETE: CPT | Performed by: INTERNAL MEDICINE

## 2023-04-25 PROCEDURE — 93005 ELECTROCARDIOGRAM TRACING: CPT | Performed by: INTERNAL MEDICINE

## 2023-04-25 RX ORDER — MAGNESIUM SULFATE IN WATER 40 MG/ML
2000 INJECTION, SOLUTION INTRAVENOUS ONCE
Status: COMPLETED | OUTPATIENT
Start: 2023-04-25 | End: 2023-04-25

## 2023-04-25 RX ORDER — SODIUM CHLORIDE 9 MG/ML
INJECTION, SOLUTION INTRAVENOUS CONTINUOUS
Status: DISCONTINUED | OUTPATIENT
Start: 2023-04-25 | End: 2023-04-25 | Stop reason: HOSPADM

## 2023-04-25 RX ORDER — LIDOCAINE HYDROCHLORIDE 20 MG/ML
SOLUTION OROPHARYNGEAL PRN
Status: COMPLETED | OUTPATIENT
Start: 2023-04-25 | End: 2023-04-25

## 2023-04-25 RX ORDER — MIDAZOLAM HYDROCHLORIDE 1 MG/ML
INJECTION INTRAMUSCULAR; INTRAVENOUS PRN
Status: COMPLETED | OUTPATIENT
Start: 2023-04-25 | End: 2023-04-25

## 2023-04-25 RX ORDER — FENTANYL CITRATE 50 UG/ML
INJECTION, SOLUTION INTRAMUSCULAR; INTRAVENOUS PRN
Status: COMPLETED | OUTPATIENT
Start: 2023-04-25 | End: 2023-04-25

## 2023-04-25 RX ADMIN — LIDOCAINE HYDROCHLORIDE 15 ML: 20 SOLUTION ORAL at 13:45

## 2023-04-25 RX ADMIN — FENTANYL CITRATE 25 MCG: 50 INJECTION, SOLUTION INTRAMUSCULAR; INTRAVENOUS at 13:56

## 2023-04-25 RX ADMIN — MIDAZOLAM 2 MG: 1 INJECTION INTRAMUSCULAR; INTRAVENOUS at 13:56

## 2023-04-25 RX ADMIN — MAGNESIUM SULFATE HEPTAHYDRATE 2000 MG: 40 INJECTION, SOLUTION INTRAVENOUS at 12:07

## 2023-04-25 NOTE — DISCHARGE INSTRUCTIONS
AFTER YOU TRANSESOPHAGEAL ECHOCARDIOGRAM    Be sure someone else drives you home. You may feel drowsy for several hours. Do not eat or drink for at least two hours after your procedure. Your throat will be numb and there is a risk you might have difficulty swallowing for a while. Be careful when you do eat or drink for the first time especially with hot fluids since you could easily burn your throat. Call your doctor if:    You are bleeding from your throat or mouth. You have trouble breathing all of a sudden. You have chest pain or any pain that spreads to your neck, jaw, or arms. You have questions or concerns. You have a fever greater than 101°F.    Doctor: ***    Special Instructions:  Do not eat or drink anything until 1545. Avoid drinking anything hot until this evening. No driving for 24 hours.

## 2023-04-25 NOTE — PROGRESS NOTES
Patient received to [address] [place] [address] room # 14  Ambulatory from [facility]. Patient scheduled for DILLAN/CVN today with Dr He Segovia. Procedure reviewed & questions answered, voiced good understanding consent obtained & placed on chart. All medications and medical history reviewed. Will prep patient per orders. Patient & family updated on plan of care. The patient has a fraility score of 5-MILDLY FRAIL, based on need for wheelchair.

## 2023-04-25 NOTE — PROGRESS NOTES
Transesophageal Echo Note:  - Indication: Atrial fibrillation  - pt underwent successful DILLAN today in cath holding  - start 1356  - stop 1413  - sedation: 2 mg IV Midazolam, 25 mcg Fentanyl IV given by Herrera Veloz RN under my direct supervision. Direct monitoring of vital signs and respiratory status throughout the procedure. - An independent trained observer pushed medications at my direction. We monitored the patient's level of consciousness and vital signs/physiologic status throughout the procedure duration (see start and stop times above). - No complications, pt in stable condition  - DILLAN Brief Findings: Extensive spontaneous echo contrast (\" smoke \") within the left atrium, gelatinous appearing thrombus within the left atrial appendage, severely reduced left atrial appendage velocities. No severe valvular abnormalities. No mitral stenosis, mild MR. 3D valve images obtained. - Complete DILLAN report to follow. - OK for oral intake at 1613  - No driving or heavy machine operation today. - Results discussed with patient and family at bedside. Patient's mother and sister report concerns that the patient has not been taking his medications as directed. Stressed importance of taking Eliquis 5 mg twice daily in an effort to treat intracardiac thrombus, but also to decrease chance of stroke in the future. - No medication changes  - No cardioversion performed.

## 2023-04-25 NOTE — PROGRESS NOTES
DILLAN by Dr Lynn Stout  II ASA II Mallampati  2mg versed  25mcg fentanyl  Viscous Solution given at 1345  Pt tolerated well.

## 2023-07-06 ENCOUNTER — APPOINTMENT (OUTPATIENT)
Dept: GENERAL RADIOLOGY | Age: 58
End: 2023-07-06
Payer: MEDICARE

## 2023-07-06 ENCOUNTER — HOSPITAL ENCOUNTER (EMERGENCY)
Age: 58
Discharge: HOME | End: 2023-07-06
Attending: GENERAL PRACTICE
Payer: MEDICARE

## 2023-07-06 VITALS
BODY MASS INDEX: 33.33 KG/M2 | HEART RATE: 71 BPM | RESPIRATION RATE: 15 BRPM | DIASTOLIC BLOOD PRESSURE: 117 MMHG | TEMPERATURE: 98.6 F | SYSTOLIC BLOOD PRESSURE: 173 MMHG | WEIGHT: 225 LBS | OXYGEN SATURATION: 97 % | HEIGHT: 69 IN

## 2023-07-06 DIAGNOSIS — J18.9 PNEUMONIA OF LEFT LOWER LOBE DUE TO INFECTIOUS ORGANISM: ICD-10-CM

## 2023-07-06 DIAGNOSIS — I16.0 HYPERTENSIVE URGENCY: Primary | ICD-10-CM

## 2023-07-06 LAB
ALBUMIN SERPL-MCNC: 3.6 G/DL (ref 3.5–5)
ALBUMIN/GLOB SERPL: 0.8 (ref 0.4–1.6)
ALP SERPL-CCNC: 84 U/L (ref 50–136)
ALT SERPL-CCNC: 16 U/L (ref 12–65)
ANION GAP SERPL CALC-SCNC: 7 MMOL/L (ref 2–11)
AST SERPL-CCNC: 24 U/L (ref 15–37)
BASOPHILS # BLD: 0 K/UL (ref 0–0.2)
BASOPHILS NFR BLD: 1 % (ref 0–2)
BILIRUB SERPL-MCNC: 1.8 MG/DL (ref 0.2–1.1)
BUN SERPL-MCNC: 12 MG/DL (ref 6–23)
CALCIUM SERPL-MCNC: 9.6 MG/DL (ref 8.3–10.4)
CHLORIDE SERPL-SCNC: 108 MMOL/L (ref 101–110)
CO2 SERPL-SCNC: 26 MMOL/L (ref 21–32)
CREAT SERPL-MCNC: 1.4 MG/DL (ref 0.8–1.5)
DIFFERENTIAL METHOD BLD: NORMAL
EKG ATRIAL RATE: 175 BPM
EKG DIAGNOSIS: NORMAL
EKG Q-T INTERVAL: 305 MS
EKG QRS DURATION: 90 MS
EKG QTC CALCULATION (BAZETT): 336 MS
EKG R AXIS: 26 DEGREES
EKG T AXIS: -89 DEGREES
EKG VENTRICULAR RATE: 79 BPM
EOSINOPHIL # BLD: 0.1 K/UL (ref 0–0.8)
EOSINOPHIL NFR BLD: 2 % (ref 0.5–7.8)
ERYTHROCYTE [DISTWIDTH] IN BLOOD BY AUTOMATED COUNT: 13.8 % (ref 11.9–14.6)
FLUAV RNA SPEC QL NAA+PROBE: NOT DETECTED
FLUBV RNA SPEC QL NAA+PROBE: NOT DETECTED
GLOBULIN SER CALC-MCNC: 4.5 G/DL (ref 2.8–4.5)
GLUCOSE SERPL-MCNC: 99 MG/DL (ref 65–100)
HCT VFR BLD AUTO: 46 % (ref 41.1–50.3)
HGB BLD-MCNC: 15.5 G/DL (ref 13.6–17.2)
IMM GRANULOCYTES # BLD AUTO: 0 K/UL (ref 0–0.5)
IMM GRANULOCYTES NFR BLD AUTO: 0 % (ref 0–5)
LYMPHOCYTES # BLD: 1.1 K/UL (ref 0.5–4.6)
LYMPHOCYTES NFR BLD: 19 % (ref 13–44)
MCH RBC QN AUTO: 29.3 PG (ref 26.1–32.9)
MCHC RBC AUTO-ENTMCNC: 33.7 G/DL (ref 31.4–35)
MCV RBC AUTO: 87 FL (ref 82–102)
MONOCYTES # BLD: 0.4 K/UL (ref 0.1–1.3)
MONOCYTES NFR BLD: 7 % (ref 4–12)
NEUTS SEG # BLD: 4.2 K/UL (ref 1.7–8.2)
NEUTS SEG NFR BLD: 71 % (ref 43–78)
NRBC # BLD: 0 K/UL (ref 0–0.2)
PLATELET # BLD AUTO: 204 K/UL (ref 150–450)
PMV BLD AUTO: 12 FL (ref 9.4–12.3)
POTASSIUM SERPL-SCNC: 3.7 MMOL/L (ref 3.5–5.1)
PROT SERPL-MCNC: 8.1 G/DL (ref 6.3–8.2)
RBC # BLD AUTO: 5.29 M/UL (ref 4.23–5.6)
SARS-COV-2 RDRP RESP QL NAA+PROBE: NOT DETECTED
SODIUM SERPL-SCNC: 141 MMOL/L (ref 133–143)
SOURCE: NORMAL
WBC # BLD AUTO: 5.9 K/UL (ref 4.3–11.1)

## 2023-07-06 PROCEDURE — 87502 INFLUENZA DNA AMP PROBE: CPT

## 2023-07-06 PROCEDURE — 99285 EMERGENCY DEPT VISIT HI MDM: CPT

## 2023-07-06 PROCEDURE — 85025 COMPLETE CBC W/AUTO DIFF WBC: CPT

## 2023-07-06 PROCEDURE — 6360000002 HC RX W HCPCS: Performed by: GENERAL PRACTICE

## 2023-07-06 PROCEDURE — 87635 SARS-COV-2 COVID-19 AMP PRB: CPT

## 2023-07-06 PROCEDURE — 96374 THER/PROPH/DIAG INJ IV PUSH: CPT

## 2023-07-06 PROCEDURE — 80053 COMPREHEN METABOLIC PANEL: CPT

## 2023-07-06 PROCEDURE — 71045 X-RAY EXAM CHEST 1 VIEW: CPT

## 2023-07-06 RX ORDER — AZITHROMYCIN 250 MG/1
250 TABLET, FILM COATED ORAL SEE ADMIN INSTRUCTIONS
Qty: 6 TABLET | Refills: 0 | Status: SHIPPED | OUTPATIENT
Start: 2023-07-06 | End: 2023-07-11

## 2023-07-06 RX ORDER — LABETALOL HYDROCHLORIDE 5 MG/ML
20 INJECTION, SOLUTION INTRAVENOUS
Status: COMPLETED | OUTPATIENT
Start: 2023-07-06 | End: 2023-07-06

## 2023-07-06 RX ADMIN — LABETALOL HYDROCHLORIDE 20 MG: 5 INJECTION INTRAVENOUS at 13:13

## 2023-07-06 ASSESSMENT — PAIN SCALES - GENERAL: PAINLEVEL_OUTOF10: 0

## 2023-07-06 ASSESSMENT — LIFESTYLE VARIABLES
HOW MANY STANDARD DRINKS CONTAINING ALCOHOL DO YOU HAVE ON A TYPICAL DAY: PATIENT DOES NOT DRINK
HOW OFTEN DO YOU HAVE A DRINK CONTAINING ALCOHOL: NEVER

## 2023-07-06 ASSESSMENT — PAIN - FUNCTIONAL ASSESSMENT: PAIN_FUNCTIONAL_ASSESSMENT: 0-10

## 2023-07-06 NOTE — ED TRIAGE NOTES
Pt arrives via EMS from home with flu-like symptoms. HH was at pt house today and said pt was hypertension during their visit, [address] called out to EMS on pt behalf for sx and BP. Pt rhythm with EMS A-flutter with PVC's, pt states hx a-fib, states is currently on Eliquis. Pt also states hx stroke 2020, deficits include paralysis on left side. Pt states uses wheelchair to move around his house. Pt lives alone with [address] coming out to the house daily.  Pt states did take all BP meds this AM.

## 2023-07-06 NOTE — ED NOTES
Patient resting on stretcher in no acute distress awaiting transport home. DC instructions discussed with patient and home care Aid - Jennifer Walker.        J Carlos Sandhu RN  07/06/23 [id number]

## 2023-07-06 NOTE — ED PROVIDER NOTES
Emergency Department Provider Note       PCP: TIANNA Calvin   Age: 59 y.o. Sex: male     DISPOSITION Discharge - Pending Orders Complete 07/06/2023 01:02:52 PM       ICD-10-CM    1. Hypertensive urgency  I16.0       2. Pneumonia of left lower lobe due to infectious organism  J18.9           Medical Decision Making     Complexity of Problems Addressed:  1 or more acute illnesses that pose a threat to life or bodily function. Data Reviewed and Analyzed:  Category 1:   I independently ordered and reviewed each unique test.  I reviewed external records: ED visit note from an outside group. I reviewed ED note from [facility] from February 6, 2023      Category 2:   I interpreted the X-rays left lower lobe infiltrate on the chest x-ray, I reviewed the radiology report. Category 3: Discussion of management or test interpretation. Patient presents with high blood pressure and infectious symptoms. There is nothing to suggest sepsis or serious bacterial infection at this time. Patient has no complaints concerning regarding the blood pressure. Patient is denying chest pain, shortness of breath, unilateral weakness or numbness, headache, or any other concerns. Patient was given a dose of labetalol 20 mg IV. Patient is to continue his blood pressure medication regimen at home. Patient is found to be in atrial fibrillation which is chronic but is rate controlled and is already on blood thinners. There is concern for pneumonia on the chest x-ray. Patient is not hypoxic and not having any respiratory distress and I believe stable for outpatient management of antibiotics. Strict return precautions are given. Risk of Complications and/or Morbidity of Patient Management:  Drug therapy given requiring intensive monitoring for toxicity. Patient was discharged risks and benefits of hospitalization were considered. Chronic medical problems impacting care include atrial fibrillation, hypertension.   Shared medical

## 2024-01-02 ENCOUNTER — APPOINTMENT (OUTPATIENT)
Dept: GENERAL RADIOLOGY | Age: 59
End: 2024-01-02
Payer: MEDICARE

## 2024-01-02 ENCOUNTER — HOSPITAL ENCOUNTER (EMERGENCY)
Age: 59
Discharge: HOME | End: 2024-01-02
Attending: EMERGENCY MEDICINE
Payer: MEDICARE

## 2024-01-02 VITALS
WEIGHT: 199 LBS | TEMPERATURE: 97.7 F | HEART RATE: 94 BPM | HEIGHT: 69 IN | OXYGEN SATURATION: 97 % | RESPIRATION RATE: 18 BRPM | BODY MASS INDEX: 29.47 KG/M2 | SYSTOLIC BLOOD PRESSURE: 148 MMHG | DIASTOLIC BLOOD PRESSURE: 106 MMHG

## 2024-01-02 DIAGNOSIS — I10 UNCONTROLLED HYPERTENSION: Primary | ICD-10-CM

## 2024-01-02 LAB
ALBUMIN SERPL-MCNC: 3.5 G/DL (ref 3.2–4.6)
ALBUMIN/GLOB SERPL: 0.9 (ref 0.4–1.6)
ALP SERPL-CCNC: 73 U/L (ref 50–136)
ALT SERPL-CCNC: 9 U/L (ref 12–65)
ANION GAP SERPL CALC-SCNC: 2 MMOL/L (ref 2–11)
AST SERPL-CCNC: 21 U/L (ref 15–37)
BASOPHILS # BLD: 0 K/UL (ref 0–0.2)
BASOPHILS NFR BLD: 0 % (ref 0–2)
BILIRUB SERPL-MCNC: 1.2 MG/DL (ref 0.2–1.1)
BUN SERPL-MCNC: 12 MG/DL (ref 8–23)
CALCIUM SERPL-MCNC: 9.2 MG/DL (ref 8.3–10.4)
CHLORIDE SERPL-SCNC: 109 MMOL/L (ref 103–113)
CO2 SERPL-SCNC: 29 MMOL/L (ref 21–32)
CREAT SERPL-MCNC: 1.34 MG/DL (ref 0.8–1.5)
DIFFERENTIAL METHOD BLD: ABNORMAL
EKG ATRIAL RATE: 357 BPM
EKG DIAGNOSIS: NORMAL
EKG P AXIS: 21 DEGREES
EKG Q-T INTERVAL: 380 MS
EKG QRS DURATION: 60 MS
EKG QTC CALCULATION (BAZETT): 416 MS
EKG R AXIS: 131 DEGREES
EKG T AXIS: 25 DEGREES
EKG VENTRICULAR RATE: 72 BPM
EOSINOPHIL # BLD: 0 K/UL (ref 0–0.8)
EOSINOPHIL NFR BLD: 1 % (ref 0.5–7.8)
ERYTHROCYTE [DISTWIDTH] IN BLOOD BY AUTOMATED COUNT: 14 % (ref 11.9–14.6)
GLOBULIN SER CALC-MCNC: 3.8 G/DL (ref 2.8–4.5)
GLUCOSE SERPL-MCNC: 112 MG/DL (ref 65–100)
HCT VFR BLD AUTO: 39.3 % (ref 41.1–50.3)
HGB BLD-MCNC: 12.9 G/DL (ref 13.6–17.2)
IMM GRANULOCYTES # BLD AUTO: 0 K/UL (ref 0–0.5)
IMM GRANULOCYTES NFR BLD AUTO: 0 % (ref 0–5)
LYMPHOCYTES # BLD: 1.4 K/UL (ref 0.5–4.6)
LYMPHOCYTES NFR BLD: 28 % (ref 13–44)
MCH RBC QN AUTO: 30.5 PG (ref 26.1–32.9)
MCHC RBC AUTO-ENTMCNC: 32.8 G/DL (ref 31.4–35)
MCV RBC AUTO: 92.9 FL (ref 82–102)
MONOCYTES # BLD: 0.4 K/UL (ref 0.1–1.3)
MONOCYTES NFR BLD: 7 % (ref 4–12)
NEUTS SEG # BLD: 3.3 K/UL (ref 1.7–8.2)
NEUTS SEG NFR BLD: 64 % (ref 43–78)
NRBC # BLD: 0 K/UL (ref 0–0.2)
PLATELET # BLD AUTO: 175 K/UL (ref 150–450)
PMV BLD AUTO: 11.8 FL (ref 9.4–12.3)
POTASSIUM SERPL-SCNC: 4 MMOL/L (ref 3.5–5.1)
PROT SERPL-MCNC: 7.3 G/DL (ref 6.3–8.2)
RBC # BLD AUTO: 4.23 M/UL (ref 4.23–5.6)
SODIUM SERPL-SCNC: 140 MMOL/L (ref 136–146)
TROPONIN I SERPL HS-MCNC: 155.6 PG/ML (ref 0–14)
TROPONIN I SERPL HS-MCNC: 170.5 PG/ML (ref 0–14)
TROPONIN I SERPL HS-MCNC: 190.3 PG/ML (ref 0–14)
WBC # BLD AUTO: 5.1 K/UL (ref 4.3–11.1)

## 2024-01-02 PROCEDURE — 96374 THER/PROPH/DIAG INJ IV PUSH: CPT

## 2024-01-02 PROCEDURE — 71045 X-RAY EXAM CHEST 1 VIEW: CPT

## 2024-01-02 PROCEDURE — 93005 ELECTROCARDIOGRAM TRACING: CPT | Performed by: EMERGENCY MEDICINE

## 2024-01-02 PROCEDURE — 93010 ELECTROCARDIOGRAM REPORT: CPT | Performed by: INTERNAL MEDICINE

## 2024-01-02 PROCEDURE — 6370000000 HC RX 637 (ALT 250 FOR IP): Performed by: EMERGENCY MEDICINE

## 2024-01-02 PROCEDURE — 85025 COMPLETE CBC W/AUTO DIFF WBC: CPT

## 2024-01-02 PROCEDURE — 96376 TX/PRO/DX INJ SAME DRUG ADON: CPT

## 2024-01-02 PROCEDURE — 99285 EMERGENCY DEPT VISIT HI MDM: CPT

## 2024-01-02 PROCEDURE — 84484 ASSAY OF TROPONIN QUANT: CPT

## 2024-01-02 PROCEDURE — 6360000002 HC RX W HCPCS: Performed by: EMERGENCY MEDICINE

## 2024-01-02 PROCEDURE — 80053 COMPREHEN METABOLIC PANEL: CPT

## 2024-01-02 RX ORDER — HYDRALAZINE HYDROCHLORIDE 20 MG/ML
20 INJECTION INTRAMUSCULAR; INTRAVENOUS
Status: DISCONTINUED | OUTPATIENT
Start: 2024-01-02 | End: 2024-01-02

## 2024-01-02 RX ORDER — HYDRALAZINE HYDROCHLORIDE 20 MG/ML
10 INJECTION INTRAMUSCULAR; INTRAVENOUS
Status: COMPLETED | OUTPATIENT
Start: 2024-01-02 | End: 2024-01-02

## 2024-01-02 RX ADMIN — NITROGLYCERIN 1 INCH: 20 OINTMENT TOPICAL at 17:07

## 2024-01-02 RX ADMIN — HYDRALAZINE HYDROCHLORIDE 10 MG: 20 INJECTION, SOLUTION INTRAMUSCULAR; INTRAVENOUS at 18:36

## 2024-01-02 RX ADMIN — HYDRALAZINE HYDROCHLORIDE 10 MG: 20 INJECTION, SOLUTION INTRAMUSCULAR; INTRAVENOUS at 15:43

## 2024-01-02 ASSESSMENT — ENCOUNTER SYMPTOMS
ABDOMINAL PAIN: 0
NAUSEA: 0
COUGH: 0
SHORTNESS OF BREATH: 0
VOMITING: 0

## 2024-01-02 ASSESSMENT — LIFESTYLE VARIABLES
HOW OFTEN DO YOU HAVE A DRINK CONTAINING ALCOHOL: NEVER
HOW MANY STANDARD DRINKS CONTAINING ALCOHOL DO YOU HAVE ON A TYPICAL DAY: PATIENT DOES NOT DRINK

## 2024-01-02 ASSESSMENT — PAIN - FUNCTIONAL ASSESSMENT: PAIN_FUNCTIONAL_ASSESSMENT: 0-10

## 2024-01-02 ASSESSMENT — PAIN SCALES - GENERAL: PAINLEVEL_OUTOF10: 0

## 2024-01-02 NOTE — ED PROVIDER NOTES
Emergency Department Provider Note       PCP: Vamshi Carbajal PA   Age: 58 y.o.   Sex: male     DISPOSITION Decision To Admit 01/02/2024 07:43:33 PM       ICD-10-CM    1. Uncontrolled hypertension  I10       2. Elevated troponin  R79.89           Medical Decision Making     Complexity of Problems Addressed:  1 or more acute illnesses that pose a threat to life or bodily function.     Data Reviewed and Analyzed:   I independently ordered and reviewed each unique test.  I reviewed external records: previous lab results from outside ED.   Nuclear medicine stress test reviewed from 11/8/23.  Small sized, moderate severity, fixed defect of the apical lateral and apex segments. There is normal associated wall motion and tissue attenuation suggesting defect may be secondary to artifact. No evidence of ischemia.      I independently ordered and interpreted the ED EKG in the absence of a Cardiologist.    Rate: 72  EKG Interpretation: Atrial flutter.  ST Segments: Normal ST segments - NO STEMI      I interpreted the X-rays chest x-ray with no infiltrate or consolidation.  Agree with radiologist interpretation..    Discussion of management or test interpretation.  58-year-old male with history of hypertension, type 2 diabetes mellitus, HIV, right MCA CVA with residual left upper extremity weakness, atrial fibrillation presents with complaint of elevated blood pressure.  Patient denies any active chest pain, shortness of breath.  Patient was reportedly diaphoretic earlier today but has resolved.  Blood pressure on arrival 193/116.  Repeat blood pressure in the room 150s over 100.  Hydralazine 10 mg IV ordered.  Patient denies any active headache.  Initial troponin 155.6.  Repeat troponin pending.  Remainder of labs unremarkable.  Repeat troponin trending up to 190.  Repeat third troponin trending down to 170s.  Repeat blood pressure 140s over 80s.  Patient with no active chest pain or shortness of breath.  Case was discussed

## 2024-01-02 NOTE — ED TRIAGE NOTES
Pt w/c to triage with c/o HTN and diaphoresis. Pt also c/o headache. Pt reports he was seen at [facility] ER on Friday for the same.

## 2024-01-03 NOTE — ED NOTES
I have reviewed discharge instructions with the patient.  The patient verbalized understanding.    Patient left ED via Discharge Method: ambulatory to Home with self.    Opportunity for questions and clarification provided.       Patient given 0 scripts.         To continue your aftercare when you leave the hospital, you may receive an automated call from our care team to check in on how you are doing.  This is a free service and part of our promise to provide the best care and service to meet your aftercare needs.” If you have questions, or wish to unsubscribe from this service please call [phone].  Thank you for Choosing our [facility] Emergency Department.

## 2024-01-03 NOTE — DISCHARGE INSTRUCTIONS
Continue to take previously prescribed blood pressure medication.  Schedule close follow-up with [facility] cardiology, primary care physician.  Please return to ED if symptoms worsen or progress in any way.